# Patient Record
Sex: MALE | Race: WHITE | NOT HISPANIC OR LATINO | URBAN - METROPOLITAN AREA
[De-identification: names, ages, dates, MRNs, and addresses within clinical notes are randomized per-mention and may not be internally consistent; named-entity substitution may affect disease eponyms.]

---

## 2017-07-16 ENCOUNTER — HOSPITAL ENCOUNTER (EMERGENCY)
Facility: CLINIC | Age: 32
Discharge: HOME | End: 2017-07-16
Attending: PSYCHIATRY & NEUROLOGY | Admitting: PSYCHIATRY & NEUROLOGY
Payer: COMMERCIAL

## 2017-07-16 ENCOUNTER — NURSE TRIAGE (OUTPATIENT)
Dept: NURSING | Facility: CLINIC | Age: 32
End: 2017-07-16

## 2017-07-16 VITALS
RESPIRATION RATE: 16 BRPM | HEIGHT: 71 IN | WEIGHT: 185 LBS | SYSTOLIC BLOOD PRESSURE: 113 MMHG | TEMPERATURE: 97.2 F | OXYGEN SATURATION: 100 % | HEART RATE: 54 BPM | BODY MASS INDEX: 25.9 KG/M2 | DIASTOLIC BLOOD PRESSURE: 63 MMHG

## 2017-07-16 DIAGNOSIS — F42.9 OBSESSIVE-COMPULSIVE DISORDER, UNSPECIFIED TYPE: ICD-10-CM

## 2017-07-16 PROCEDURE — 90791 PSYCH DIAGNOSTIC EVALUATION: CPT

## 2017-07-16 PROCEDURE — 99285 EMERGENCY DEPT VISIT HI MDM: CPT | Mod: 25 | Performed by: PSYCHIATRY & NEUROLOGY

## 2017-07-16 PROCEDURE — 99284 EMERGENCY DEPT VISIT MOD MDM: CPT | Mod: Z6 | Performed by: PSYCHIATRY & NEUROLOGY

## 2017-07-16 ASSESSMENT — ENCOUNTER SYMPTOMS
NERVOUS/ANXIOUS: 1
HALLUCINATIONS: 0
DYSPHORIC MOOD: 1
FEVER: 0
SHORTNESS OF BREATH: 0
ABDOMINAL PAIN: 0

## 2017-07-16 NOTE — TELEPHONE ENCOUNTER
Patient calling to report he spoke with his therapist yesterday and reported thoughts of suicide and was recommended he be evaluated in ER. Will go to [place].   Reason for Disposition    Sometimes has thoughts of suicide    Additional Information    Negative: Patient attempted suicide    Negative: Patient is threatening suicide now    Negative: Patient is threatening to kill a specific person    Negative: [1] Patient is very confused (disoriented, slurred speech) AND [2] no other adult (e.g., friend or family member) available    Negative: [1] Difficult to awaken or acting very confused (disoriented, slurred speech) AND [2] new onset    Negative: Sounds like a life-threatening emergency to the triager    Negative: Alcohol use, abuse or dependence, question or problem related to    Negative: Drug abuse or dependence, question or problem related to    Negative: Bipolar disorder (manic depression)    Negative: Depression during the postpartum period (< 1 year since delivery)    Negative: [1] Depression AND [2] unable to do any of normal activities (e.g., self care, school, work; in comparison to baseline).    Negative: Bizarre or confused behavior    Negative: Patient sounds very sick or weak to the triager    Negative: [1] Depression AND [2] worsening (e.g.,sleeping poorly, less able to do activities of daily living)    Negative: Symptoms interfere with work or school    Protocols used: DEPRESSION-ADULT-

## 2017-07-16 NOTE — ED AVS SNAPSHOT
[facility], Emergency Department    [address]    [place] [address]    Phone:  [phone]    Fax:  [phone]                                       Tima Biswas   MRN: [id number]    Department:  [facility], Emergency Department   Date of Visit:  7/16/2017           Patient Information     Date Of Birth          1985        Your diagnoses for this visit were:     Obsessive-compulsive disorder, unspecified type        You were seen by Rocco Arroyo MD.        Discharge Instructions       Follow up with your therapist and couples counseling      Follow up with your psychiatrist for medication management    24 Hour Appointment Hotline       To make an appointment at any [facility] clinic, call [phone] ([phone]). If you don't have a family doctor or clinic, we will help you find one. [facility] clinics are conveniently located to serve the needs of you and your family.             Review of your medicines      Our records show that you are taking the medicines listed below. If these are incorrect, please call your family doctor or clinic.        Dose / Directions Last dose taken    OMEPRAZOLE PO        Take by mouth daily   Refills:  0        SERTRALINE HCL PO   Dose:  75 mg        Take 75 mg by mouth daily   Refills:  0                Orders Needing Specimen Collection     Ordered          07/16/17 2152  Drug abuse screen 6 urine (chem dep) ([facility]) - STAT, [place]: STAT, Needs to be Collected     Scheduled Task Status   07/16/17 2153 Collect Drug abuse screen 6 urine (chem dep) ([facility]) Open   Order Class:  PCU Collect                  Pending Results     No orders found from 7/14/2017 to 7/17/2017.            Pending Culture Results     No orders found from 7/14/2017 to 7/17/2017.            Pending Results Instructions     If you had any lab results that were not finalized at the time of your Discharge, you can call the ED Lab Result RN at [phone]. You will be contacted by  "this team for any positive Lab results or changes in treatment. The nurses are available 7 days a week from 10A to 6:30P.  You can leave a message 24 hours per day and they will return your call.        Thank you for choosing [facility]       Thank you for choosing [facility] for your care. Our goal is always to provide you with excellent care. Hearing back from our patients is one way we can continue to improve our services. Please take a few minutes to complete the written survey that you may receive in the mail after you visit with us. Thank you!        [employer] Information     [employer] lets you send messages to your doctor, view your test results, renew your prescriptions, schedule appointments and more. To sign up, go to [email or web address] . Click on \"Log in\" on the left side of the screen, which will take you to the Welcome page. Then click on \"Sign up Now\" on the right side of the page.     You will be asked to enter the access code listed below, as well as some personal information. Please follow the directions to create your username and password.     Your access code is: [email or web address]  Expires: 10/14/2017 10:46 PM     Your access code will  in 90 days. If you need help or a new code, please call your [facility] clinic or [phone].        Care EveryWhere ID     This is your Care EveryWhere ID. This could be used by other organizations to access your [facility] medical records  [id number]        Equal Access to Services     KAUSHIK HRENANDEZ : Hadii david gardunoo Sodione, waaxda luqadaha, qaybta kaalmada kai, nano rodriguez . So [facility] [phone].    ATENCIÓN: Si habla español, tiene a garnica disposición servicios gratuitos de asistencia lingüística. Llame al [phone].    We comply with applicable federal civil rights laws and [place] laws. We do not discriminate on the basis of race, color, national origin, age, disability sex, sexual orientation or gender " identity.            After Visit Summary       This is your record. Keep this with you and show to your community pharmacist(s) and doctor(s) at your next visit.

## 2017-07-16 NOTE — ED AVS SNAPSHOT
[facility], [place], Emergency Department    [address]    [facility] [address]    Phone:  [phone]    Fax:  [phone]                                       Tima Biswas   MRN: [id number]    Department:  [facility], Emergency Department   Date of Visit:  7/16/2017           After Visit Summary Signature Page     I have received my discharge instructions, and my questions have been answered. I have discussed any challenges I see with this plan with the nurse or doctor.    ..........................................................................................................................................  Patient/Patient Representative Signature      ..........................................................................................................................................  Patient Representative Print Name and Relationship to Patient    ..................................................               ................................................  Date                                            Time    ..........................................................................................................................................  Reviewed by Signature/Title    ...................................................              ..............................................  Date                                                            Time

## 2017-07-17 NOTE — ED PROVIDER NOTES
"  History     Chief Complaint   Patient presents with     Suicidal     Suicidal without plan     The history is provided by the patient, medical records and the spouse.     Tima Biswas is a 31 year old male who comes in due to his therapist telling him yesterday that he should come to the ED for his suicidal thoughts.  He has had chronic suicidal thoughts since age 13.  He states that they have been stronger recently due to his marital struggles.  His wife has anxiety and they were struggling.  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  That has since been broken off.  He has a therapist who also happens to be his wife's therapist and their couple's counselor.  He has a psychiatrist. He has a history of anxiety, depression and OCD.  He is on zoloft 75 mg.  He tried to increase it to 100 mg but decreased it back to 75 mg because he felt numb. He states that he has been having some thoughts of overdosing on his dog's tramadol.  He has never attempted. He does not want to. He has no intent currently.      Please see the 's assessment in [employer] from today for further details.    I have reviewed the Medications, Allergies, Past Medical and Surgical History, and Social History in the Epic system.    Review of Systems   Constitutional: Negative for fever.   Respiratory: Negative for shortness of breath.    Cardiovascular: Negative for chest pain.   Gastrointestinal: Negative for abdominal pain.   Psychiatric/Behavioral: Positive for dysphoric mood and suicidal ideas (chronic, passive). Negative for hallucinations and self-injury. The patient is nervous/anxious.    All other systems reviewed and are negative.      Physical Exam   BP: 122/68  Heart Rate: 58  Temp: 96.7  F (35.9  C)  Resp: 16  Height: 180.3 cm (5' 11\")  Weight: 83.9 kg (185 lb)  SpO2: 98 %  Physical Exam   Constitutional: He is oriented to person, place, and time. He appears well-developed and well-nourished.   HENT:   Head: Normocephalic " and atraumatic.   Mouth/Throat: Oropharynx is clear and moist. No oropharyngeal exudate.   Eyes: Pupils are equal, round, and reactive to light.   Neck: Normal range of motion. Neck supple.   Cardiovascular: Normal rate, regular rhythm and normal heart sounds.    Pulmonary/Chest: Effort normal and breath sounds normal. No respiratory distress.   Abdominal: Soft. Bowel sounds are normal. There is no tenderness.   Musculoskeletal: Normal range of motion.   Neurological: He is alert and oriented to person, place, and time.   Skin: Skin is warm. No rash noted.   Psychiatric: His speech is normal and behavior is normal. Judgment normal. His mood appears anxious. He is not actively hallucinating. Thought content is not paranoid and not delusional. Cognition and memory are normal. He exhibits a depressed mood. He expresses suicidal ideation. He expresses no homicidal ideation. He expresses no suicidal plans and no homicidal plans.   Tima is a 30 y/o male who looks his age. He is well groomed with good eye contact.   Nursing note and vitals reviewed.      ED Course     ED Course     Procedures               Labs Ordered and Resulted from Time of ED Arrival Up to the Time of Departure from the ED - No data to display         Assessments & Plan (with Medical Decision Making)   Tima will be discharged home.  He is not an imminent risk to himself or others.  He has chronic suicidal thoughts with no intent right now.  His wife is going to take the dog's tramadol so he has no access to it.  He will continue to follow up with his therapist and couples counseling. He will follow up with his psychiatrist as well.    I have reviewed the nursing notes.    I have reviewed the findings, diagnosis, plan and need for follow up with the patient.    New Prescriptions    No medications on file       Final diagnoses:   Obsessive-compulsive disorder, unspecified type       7/16/2017   [facility], [place], EMERGENCY DEPARTMENT     Dina  Rocco BECK MD  07/16/17 2010

## 2017-07-17 NOTE — DISCHARGE INSTRUCTIONS
Follow up with your therapist and couples counseling      Follow up with your psychiatrist for medication management

## 2018-11-21 DIAGNOSIS — R06.02 SOB (SHORTNESS OF BREATH): ICD-10-CM

## 2018-11-21 DIAGNOSIS — R05.9 COUGH: Primary | ICD-10-CM

## 2018-11-21 NOTE — TELEPHONE ENCOUNTER
FUTURE VISIT INFORMATION      FUTURE VISIT INFORMATION:    Date: 12.7.18    Time: 9:00 AM    Location: [facility]   REFERRAL INFORMATION:    Referring provider:  Self-referred    Referring providers clinic:  Self-referred    Reason for visit/diagnosis  Cough and SOB    RECORDS REQUESTED FROM:       Clinic name Comments Records Status Imaging Status                                           **Attempted to call pt to see if there are any medical records, however voicemail box is full.

## 2018-12-07 ENCOUNTER — OFFICE VISIT (OUTPATIENT)
Dept: PULMONOLOGY | Facility: CLINIC | Age: 33
End: 2018-12-07
Attending: INTERNAL MEDICINE
Payer: COMMERCIAL

## 2018-12-07 ENCOUNTER — RADIANT APPOINTMENT (OUTPATIENT)
Dept: GENERAL RADIOLOGY | Facility: CLINIC | Age: 33
End: 2018-12-07
Payer: COMMERCIAL

## 2018-12-07 ENCOUNTER — PRE VISIT (OUTPATIENT)
Dept: PULMONOLOGY | Facility: CLINIC | Age: 33
End: 2018-12-07

## 2018-12-07 VITALS
HEIGHT: 71 IN | DIASTOLIC BLOOD PRESSURE: 80 MMHG | OXYGEN SATURATION: 98 % | RESPIRATION RATE: 16 BRPM | HEART RATE: 58 BPM | BODY MASS INDEX: 26.6 KG/M2 | WEIGHT: 190 LBS | SYSTOLIC BLOOD PRESSURE: 126 MMHG

## 2018-12-07 DIAGNOSIS — M25.561 ARTHRALGIA OF BOTH KNEES: ICD-10-CM

## 2018-12-07 DIAGNOSIS — Z91.89 RECENTLY IN LYME DISEASE ENDEMIC AREA: ICD-10-CM

## 2018-12-07 DIAGNOSIS — R05.9 COUGH: ICD-10-CM

## 2018-12-07 DIAGNOSIS — M25.562 ARTHRALGIA OF BOTH KNEES: ICD-10-CM

## 2018-12-07 DIAGNOSIS — R06.02 SOB (SHORTNESS OF BREATH): Primary | ICD-10-CM

## 2018-12-07 DIAGNOSIS — R06.02 SOB (SHORTNESS OF BREATH): ICD-10-CM

## 2018-12-07 DIAGNOSIS — J45.30 MILD PERSISTENT ASTHMA WITHOUT COMPLICATION: Primary | ICD-10-CM

## 2018-12-07 PROCEDURE — G0463 HOSPITAL OUTPT CLINIC VISIT: HCPCS | Mod: ZF

## 2018-12-07 RX ORDER — MONTELUKAST SODIUM 10 MG/1
10 TABLET ORAL EVERY EVENING
Qty: 30 TABLET | Refills: 11 | Status: SHIPPED | OUTPATIENT
Start: 2018-12-07 | End: 2020-01-16

## 2018-12-07 ASSESSMENT — PAIN SCALES - GENERAL: PAINLEVEL: NO PAIN (0)

## 2018-12-07 NOTE — LETTER
12/7/2018       RE: Tima Biswas  [address] [facility] [address]     Dear Colleague,    Thank you for referring your patient, Tima Biswas, to the [facility] at [facility]. Please see a copy of my visit note below.    [facility] Pulmonology Initial Consult    Reason for Visit  Tima Biswas is a 32 year old male who is referred by himself for cough  Pulmonary HPI  Sporadic cough/hack over a couple of years. Omeprazole, steroids, albuterol have been attempted without improvement. Reports the cough has increased in severity over time, humidity/changing seasons including shower. Sometimes after eating, but no choking sensation. He does experience severe heartburn for years, takes Tums a couple of times a day. Feels like he can't take a deep breath. Sinus congestion without drainage. Reports occasional sensation of choking that seem to be spontaneous, not related to eating or supine position, no associated heartburn. He reports fatigue, irritability, joint pain. Exercise helps to loosen them up, knees and ankles especially, back always so he barely thinks about it.  He particularly notes tenderness and swelling at different points on the knee where tendons and ligaments insert.  He also used to swim, but reports doing so less partly because of coughing and partly because of shortness of breath.  Wakes at 4:30-5:30, joints don't loosen up until after 730. He does see swelling in the knees and tenderness.  He is also concerned about Lyme disease.  He grew up in [place], [place] and his father not long ago was diagnosed with Guyon Barré and that was attributed to Lyme disease.  He does not recall any specific acute illness but does have some symptoms and could be consistent with chronic Lyme infection.    The patient was seen and examined by Felicia Kasper MD   Current Outpatient Prescriptions   Medication  "    SERTRALINE HCL PO     OMEPRAZOLE PO     No current facility-administered medications for this visit.      Allergies   Allergen Reactions     Cats      Social History     Socioeconomic History     Marital status:      Spouse name: Not on file     Number of children: Not on file     Years of education: Not on file     Highest education level: Not on file   Social Needs     Financial resource strain: Not on file     Food insecurity - worry: Not on file     Food insecurity - inability: Not on file     Transportation needs - medical: Not on file     Transportation needs - non-medical: Not on file   Occupational History     Not on file   Tobacco Use     Smoking status: Never Smoker     Smokeless tobacco: Never Used   Substance and Sexual Activity     Alcohol use: Yes     Comment: rare     Drug use: No     Sexual activity: Not on file   Other Topics Concern     Not on file   Social History Narrative     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     Past Medical History:   Diagnosis Date     Depressive disorder      Gastroesophageal reflux disease      OCD (obsessive compulsive disorder)      Past Surgical History:   Procedure Laterality Date     GENITOURINARY SURGERY       Family History   Problem Relation Age of Onset     LUNG DISEASE Mother         idiopathic lung disease treated with steroids     Neuromuscular Disease Father         [place] associated with chronic Lyme     Lung Cancer No family hx of        ROS Pulmonary  Dyspnea: Yes, Cough: Yes, Chest pain: No, Wheezing: No, Sputum Production: No, Hemoptysis: No  A complete ROS was otherwise negative except as noted in the HPI.  /80 (BP Location: Right arm, Patient Position: Chair, Cuff Size: Adult Regular)  Pulse 58  Resp 16  Ht 1.803 m (5' 10.98\")  Wt 86.2 kg (190 lb)  SpO2 98%  BMI 26.51 kg/m2  Exam:   GENERAL APPEARANCE: Well developed, well nourished, alert, and in no apparent distress.  EYES: PERRL, EOMI  HENT: Nasal mucosa with no edema and no " hyperemia. No nasal polyps.  EARS: Canals clear, TMs normal  MOUTH: Oral mucosa is moist, without any lesions, no tonsillar enlargement, no oropharyngeal exudate.  NECK: supple, no masses, no thyromegaly.  LYMPHATICS: No significant axillary, cervical, or supraclavicular nodes.  RESP: normal percussion, good air flow throughout.  No crackles. No rhonchi. No wheezes.  CV: Normal S1, S2, regular rhythm, normal rate. No murmur.  No rub. No gallop. No LE edema.   ABDOMEN:  Bowel sounds normal, soft, nontender, no HSM or masses.   MS: extremities normal. No clubbing. No cyanosis.  SKIN: no rash on limited exam  NEURO: Mentation intact, speech normal, normal strength and tone, normal gait and stance  PSYCH: mentation appears normal. and affect normal/bright  Results:  Pulmonary function test today were reviewed and showed normal spirometry, lung volumes, diffusion capacity  Chest x-ray from today images were reviewed and normal clear bilateral lung fields.    Assessment and plan: Elijah is a 32-year-old male who is a  employed at the [employer].  He is here today for 2 years of unexplained cough, with some shortness of breath he also has a variety of other symptoms including heartburn, fatigue, knee pain, concern for Lyme disease.  Today we reviewed the differential diagnosis which for him is most likely cough variant asthma, gastroesophageal reflux disease.  He has not responded to prolonged therapy with a PPI or intermittent albuterol.    Possible mild persistent asthma, cough variant-today we are starting daily montelukast.  We will see if this improves his cough, if no improvement in about a month then we would consider escalating to an inhaled steroid.  If there is no relief with an inhaled steroid after 1-2 months, I would plan on performing a CT of his chest.    Gastroesophageal reflux disease with heartburn-he tries to avoid triggers as best as possible, I have asked him to stop taking Tums  every day.  I recommended an H2 blocker for symptomatic relief of heartburn whenever he has it.  If these do not improve the situation, then I will pursue additional testing.    Arthralgias, concern for Lyme disease-he reports extended morning stiffness but attributes it to strenuous strength training exercise.  His constellation of back pain, enthesitis could also be consistent with ankylosing spondylitis.  At his request, I will order a Lyme serology.  We can consider doing spine x-rays in the future.      Again, thank you for allowing me to participate in the care of your patient.      Sincerely,    Felicia Kasper MD

## 2018-12-07 NOTE — NURSING NOTE
Chief Complaint   Patient presents with     Consult     Cough and Shortness of Breathe      Tiara Hanna CMA

## 2018-12-07 NOTE — LETTER
Date:December 12, 2018      Patient was self referred, no letter generated. Do not send.        [facility] Information

## 2018-12-07 NOTE — PROGRESS NOTES
[facility] Pulmonology Initial Consult    Reason for Visit  Tima Biswas is a 32 year old male who is referred by himself for cough  Pulmonary HPI  Sporadic cough/hack over a couple of years. Omeprazole, steroids, albuterol have been attempted without improvement. Reports the cough has increased in severity over time, humidity/changing seasons including shower. Sometimes after eating, but no choking sensation. He does experience severe heartburn for years, takes Tums a couple of times a day. Feels like he can't take a deep breath. Sinus congestion without drainage. Reports occasional sensation of choking that seem to be spontaneous, not related to eating or supine position, no associated heartburn. He reports fatigue, irritability, joint pain. Exercise helps to loosen them up, knees and ankles especially, back always so he barely thinks about it.  He particularly notes tenderness and swelling at different points on the knee where tendons and ligaments insert.  He also used to swim, but reports doing so less partly because of coughing and partly because of shortness of breath.  Wakes at 4:30-5:30, joints don't loosen up until after 730. He does see swelling in the knees and tenderness.  He is also concerned about Lyme disease.  He grew up in [place], [place] and his father not long ago was diagnosed with Guyon Barré and that was attributed to Lyme disease.  He does not recall any specific acute illness but does have some symptoms and could be consistent with chronic Lyme infection.    The patient was seen and examined by Felicia Kasper MD   Current Outpatient Prescriptions   Medication     SERTRALINE HCL PO     OMEPRAZOLE PO     No current facility-administered medications for this visit.      Allergies   Allergen Reactions     Cats      Social History     Socioeconomic History     Marital status:      Spouse name: Not on file     Number of children: Not on file     Years of  "education: Not on file     Highest education level: Not on file   Social Needs     Financial resource strain: Not on file     Food insecurity - worry: Not on file     Food insecurity - inability: Not on file     Transportation needs - medical: Not on file     Transportation needs - non-medical: Not on file   Occupational History     Not on file   Tobacco Use     Smoking status: Never Smoker     Smokeless tobacco: Never Used   Substance and Sexual Activity     Alcohol use: Yes     Comment: rare     Drug use: No     Sexual activity: Not on file   Other Topics Concern     Not on file   Social History Narrative     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     Past Medical History:   Diagnosis Date     Depressive disorder      Gastroesophageal reflux disease      OCD (obsessive compulsive disorder)      Past Surgical History:   Procedure Laterality Date     GENITOURINARY SURGERY       Family History   Problem Relation Age of Onset     LUNG DISEASE Mother         idiopathic lung disease treated with steroids     Neuromuscular Disease Father         Guillan [place] associated with chronic Lyme     Lung Cancer No family hx of        ROS Pulmonary  Dyspnea: Yes, Cough: Yes, Chest pain: No, Wheezing: No, Sputum Production: No, Hemoptysis: No  A complete ROS was otherwise negative except as noted in the HPI.  /80 (BP Location: Right arm, Patient Position: Chair, Cuff Size: Adult Regular)  Pulse 58  Resp 16  Ht 1.803 m (5' 10.98\")  Wt 86.2 kg (190 lb)  SpO2 98%  BMI 26.51 kg/m2  Exam:   GENERAL APPEARANCE: Well developed, well nourished, alert, and in no apparent distress.  EYES: PERRL, EOMI  HENT: Nasal mucosa with no edema and no hyperemia. No nasal polyps.  EARS: Canals clear, TMs normal  MOUTH: Oral mucosa is moist, without any lesions, no tonsillar enlargement, no oropharyngeal exudate.  NECK: supple, no masses, no thyromegaly.  LYMPHATICS: No significant axillary, cervical, or supraclavicular nodes.  RESP: normal " percussion, good air flow throughout.  No crackles. No rhonchi. No wheezes.  CV: Normal S1, S2, regular rhythm, normal rate. No murmur.  No rub. No gallop. No LE edema.   ABDOMEN:  Bowel sounds normal, soft, nontender, no HSM or masses.   MS: extremities normal. No clubbing. No cyanosis.  SKIN: no rash on limited exam  NEURO: Mentation intact, speech normal, normal strength and tone, normal gait and stance  PSYCH: mentation appears normal. and affect normal/bright  Results:  Pulmonary function test today were reviewed and showed normal spirometry, lung volumes, diffusion capacity  Chest x-ray from today images were reviewed and normal clear bilateral lung fields.    Assessment and plan: Elijah is a 32-year-old male who is a  employed at the [employer].  He is here today for 2 years of unexplained cough, with some shortness of breath he also has a variety of other symptoms including heartburn, fatigue, knee pain, concern for Lyme disease.  Today we reviewed the differential diagnosis which for him is most likely cough variant asthma, gastroesophageal reflux disease.  He has not responded to prolonged therapy with a PPI or intermittent albuterol.    Possible mild persistent asthma, cough variant-today we are starting daily montelukast.  We will see if this improves his cough, if no improvement in about a month then we would consider escalating to an inhaled steroid.  If there is no relief with an inhaled steroid after 1-2 months, I would plan on performing a CT of his chest.    Gastroesophageal reflux disease with heartburn-he tries to avoid triggers as best as possible, I have asked him to stop taking Tums every day.  I recommended an H2 blocker for symptomatic relief of heartburn whenever he has it.  If these do not improve the situation, then I will pursue additional testing.    Arthralgias, concern for Lyme disease-he reports extended morning stiffness but attributes it to strenuous strength  training exercise.  His constellation of back pain, enthesitis could also be consistent with ankylosing spondylitis.  At his request, I will order a Lyme serology.  We can consider doing spine x-rays in the future.

## 2018-12-07 NOTE — MR AVS SNAPSHOT
After Visit Summary   12/7/2018    Tima Biswas    MRN: [id number]           Patient Information     Date Of Birth          1985        Visit Information        Provider Department      12/7/2018 9:00 AM Felicia Kasper MD [facility]        Care Instructions    I will look into the Lyme testing. Probably unrelated to cough.     I will start montelukast for allergies/asthma. Take this every day. Another option is inhaled steroids.    In about a month, you don't notice an improvement in cough, we can try an inhaler, if no improvement in a couple of months, let's plan for a CT of the chest.    Stop taking tums. If you have heartburn, use H2 blocker or PPI (Zantac, omeprazole, etc)            Follow-ups after your visit        Follow-up notes from your care team     Return in about 3 months (around 3/7/2019).      Your next 10 appointments already scheduled     Mar 19, 2019  1:00 PM CDT   (Arrive by 12:45 PM)   Return Visit with Felicia Kasper MD   [facility] ([facility])    [address] [place] [id number] [phone]              Who to contact     If you have questions or need follow up information about today's clinic visit or your schedule please contact [facility] directly at [phone].  Normal or non-critical lab and imaging results will be communicated to you by MyChart, letter or phone within 4 business days after the clinic has received the results. If you do not hear from us within 7 days, please contact the clinic through MyChart or phone. If you have a critical or abnormal lab result, we will notify you by phone as soon as possible.  Submit refill requests through e-volo or call your pharmacy and they will forward the refill request to us. Please allow 3 business days for your refill to be completed.          Additional  "Information About Your Visit        MyChart Information     [email or web address] lets you send messages to your doctor, view your test results, renew your prescriptions, schedule appointments and more. To sign up, go to [email or web address] . Click on \"Log in\" on the left side of the screen, which will take you to the Welcome page. Then click on \"Sign up Now\" on the right side of the page.     You will be asked to enter the access code listed below, as well as some personal information. Please follow the directions to create your username and password.     Your access code is: [email or web address]  Expires: 2019  6:30 AM     Your access code will  in 90 days. If you need help or a new code, please call your [facility] clinic or [phone].        Care EveryWhere ID     This is your Care EveryWhere ID. This could be used by other organizations to access your [facility] medical records  [id number]        Your Vitals Were     Pulse Respirations Height Pulse Oximetry BMI (Body Mass Index)       58 16 1.803 m (5' 10.98\") 98% 26.51 kg/m2        Blood Pressure from Last 3 Encounters:   18 126/80   17 113/63    Weight from Last 3 Encounters:   18 86.2 kg (190 lb)   17 83.9 kg (185 lb)              Today, you had the following     No orders found for display       Primary Care Provider    [facility]       No address on file        Equal Access to Services     KAUSHIK HERNANDEZ : Hadii david gardunoo Sodione, waaxda luqadaha, qaybta kaalmada mikyyaignacio, nano rodriguez . So [facility] [phone].    ATENCIÓN: Si habla español, tiene a garnica disposición servicios gratuitos de asistencia lingüística. Llame al [phone].    We comply with applicable federal civil rights laws and [place] laws. We do not discriminate on the basis of race, color, national origin, age, disability, sex, sexual orientation, or gender identity.            Thank you!     Thank you for choosing [facility] " [facility]  for your care. Our goal is always to provide you with excellent care. Hearing back from our patients is one way we can continue to improve our services. Please take a few minutes to complete the written survey that you may receive in the mail after your visit with us. Thank you!             Your Updated Medication List - Protect others around you: Learn how to safely use, store and throw away your medicines at www.disposemymeds.org.          This list is accurate as of 12/7/18  9:50 AM.  Always use your most recent med list.                   Brand Name Dispense Instructions for use Diagnosis    OMEPRAZOLE PO      Take by mouth daily        SERTRALINE HCL PO      Take 50 mg by mouth daily

## 2018-12-09 LAB
DLCOUNC-%PRED-PRE: 97 %
DLCOUNC-PRE: 34.88 ML/MIN/MMHG
DLCOUNC-PRED: 35.68 ML/MIN/MMHG
ERV-%PRED-PRE: 79 %
ERV-PRE: 1.46 L
ERV-PRED: 1.84 L
EXPTIME-PRE: 7.36 SEC
FEF2575-%PRED-PRE: 108 %
FEF2575-PRE: 5.08 L/SEC
FEF2575-PRED: 4.69 L/SEC
FEFMAX-%PRED-PRE: 97 %
FEFMAX-PRE: 10.42 L/SEC
FEFMAX-PRED: 10.72 L/SEC
FEV1-%PRED-PRE: 103 %
FEV1-PRE: 4.9 L
FEV1FEV6-PRE: 83 %
FEV1FEV6-PRED: 83 %
FEV1FVC-PRE: 83 %
FEV1FVC-PRED: 81 %
FEV1SVC-PRE: 84 %
FEV1SVC-PRED: 79 %
FIFMAX-PRE: 10.1 L/SEC
FRCPLETH-%PRED-PRE: 84 %
FRCPLETH-PRE: 2.93 L
FRCPLETH-PRED: 3.48 L
FVC-%PRED-PRE: 102 %
FVC-PRE: 5.92 L
FVC-PRED: 5.79 L
IC-%PRED-PRE: 105 %
IC-PRE: 4.39 L
IC-PRED: 4.15 L
RVPLETH-%PRED-PRE: 78 %
RVPLETH-PRE: 1.47 L
RVPLETH-PRED: 1.87 L
TLCPLETH-%PRED-PRE: 97 %
TLCPLETH-PRE: 7.31 L
TLCPLETH-PRED: 7.53 L
VA-%PRED-PRE: 91 %
VA-PRE: 6.6 L
VC-%PRED-PRE: 97 %
VC-PRE: 5.85 L
VC-PRED: 5.99 L

## 2019-01-15 DIAGNOSIS — M25.50 MULTIPLE JOINT PAIN: ICD-10-CM

## 2019-01-15 DIAGNOSIS — M54.50 CHRONIC LOW BACK PAIN WITHOUT SCIATICA, UNSPECIFIED BACK PAIN LATERALITY: ICD-10-CM

## 2019-01-15 DIAGNOSIS — J45.30 MILD PERSISTENT ASTHMA WITHOUT COMPLICATION: Primary | ICD-10-CM

## 2019-01-15 DIAGNOSIS — G89.29 CHRONIC LOW BACK PAIN WITHOUT SCIATICA, UNSPECIFIED BACK PAIN LATERALITY: ICD-10-CM

## 2019-03-19 ENCOUNTER — OFFICE VISIT (OUTPATIENT)
Dept: PULMONOLOGY | Facility: CLINIC | Age: 34
End: 2019-03-19
Attending: INTERNAL MEDICINE
Payer: COMMERCIAL

## 2019-03-19 VITALS
OXYGEN SATURATION: 96 % | HEART RATE: 65 BPM | SYSTOLIC BLOOD PRESSURE: 121 MMHG | WEIGHT: 190 LBS | BODY MASS INDEX: 26.6 KG/M2 | HEIGHT: 71 IN | DIASTOLIC BLOOD PRESSURE: 79 MMHG | RESPIRATION RATE: 16 BRPM

## 2019-03-19 DIAGNOSIS — R05.9 COUGH: Primary | ICD-10-CM

## 2019-03-19 DIAGNOSIS — M79.10 MYALGIA: ICD-10-CM

## 2019-03-19 DIAGNOSIS — K21.9 GASTROESOPHAGEAL REFLUX DISEASE, ESOPHAGITIS PRESENCE NOT SPECIFIED: ICD-10-CM

## 2019-03-19 PROCEDURE — G0463 HOSPITAL OUTPT CLINIC VISIT: HCPCS | Mod: ZF

## 2019-03-19 ASSESSMENT — ENCOUNTER SYMPTOMS
POSTURAL DYSPNEA: 0
DYSPNEA ON EXERTION: 1
ALTERED TEMPERATURE REGULATION: 0
WEIGHT GAIN: 0
POLYDIPSIA: 0
DIARRHEA: 0
DEPRESSION: 1
NECK PAIN: 1
SHORTNESS OF BREATH: 1
ABDOMINAL PAIN: 0
COUGH DISTURBING SLEEP: 0
CONSTIPATION: 1
RECTAL PAIN: 0
PANIC: 0
COUGH: 1
FATIGUE: 1
MUSCLE WEAKNESS: 0
BLOOD IN STOOL: 0
INCREASED ENERGY: 0
WHEEZING: 0
HEARTBURN: 1
POLYPHAGIA: 1
HEMOPTYSIS: 0
INSOMNIA: 1
NAUSEA: 0
DECREASED CONCENTRATION: 1
STIFFNESS: 1
SPUTUM PRODUCTION: 0
BOWEL INCONTINENCE: 0
BLOATING: 0
FEVER: 0
DECREASED APPETITE: 0
NIGHT SWEATS: 0
NERVOUS/ANXIOUS: 1
ARTHRALGIAS: 1
CHILLS: 0
SNORES LOUDLY: 0
VOMITING: 0
HALLUCINATIONS: 0
WEIGHT LOSS: 0
BACK PAIN: 1
MYALGIAS: 1
MUSCLE CRAMPS: 0
JAUNDICE: 0

## 2019-03-19 ASSESSMENT — PATIENT HEALTH QUESTIONNAIRE - PHQ9
10. IF YOU CHECKED OFF ANY PROBLEMS, HOW DIFFICULT HAVE THESE PROBLEMS MADE IT FOR YOU TO DO YOUR WORK, TAKE CARE OF THINGS AT HOME, OR GET ALONG WITH OTHER PEOPLE: SOMEWHAT DIFFICULT
SUM OF ALL RESPONSES TO PHQ QUESTIONS 1-9: 15
SUM OF ALL RESPONSES TO PHQ QUESTIONS 1-9: 15

## 2019-03-19 ASSESSMENT — MIFFLIN-ST. JEOR: SCORE: 1833.7

## 2019-03-19 ASSESSMENT — PAIN SCALES - GENERAL: PAINLEVEL: NO PAIN (0)

## 2019-03-19 NOTE — PATIENT INSTRUCTIONS
Depending on how it goes with GI, we can consider methacholine challenge to look for inducible asthma

## 2019-03-19 NOTE — LETTER
3/19/2019       RE: Tima Biswas  [address] [facility] [address] [facility] [address]     Dear Colleague,    Thank you for referring your patient, Tima Biswas, to the [facility] at [facility]. Please see a copy of my visit note below.    [facility] Pulmonology Initial Consult    Reason for Visit  Tima Biswas is a 33 year old male who is followed for cough, shortness of breath  Pulmonary HPI  Since our last visit, I had previous spine MRI reviewed for evidence of sacroiliitis (none, but it was from 2013), Elijah stopped Tums with improvement in heartburn. He did not experience improvement in cough so was started on inhaled steroids (Asmanex) with no relief and thought he was getting worse, so stopped that as well. I ordered Lyme serology at his request last visit but it has not been done.     Has noticed cardio has decreased, hasn't done it much since the IDS climb early march. Hasn't tried albuterol for exertion. He has noticed when eating, has to stop and catch his breath. He is also taking a break from strength training but still has prolonged morning stiffness.     Cough is better in March, it has been cyclical in the past so this is not out of the ordinary.    The patient was seen and examined by Felicia Kasper MD   Current Outpatient Medications   Medication     mometasone (ASMANEX) 220 MCG/INH inhaler     montelukast (SINGULAIR) 10 MG tablet     OMEPRAZOLE PO     SERTRALINE HCL PO     No current facility-administered medications for this visit.      Allergies   Allergen Reactions     Cats      Social History     Socioeconomic History     Marital status:      Spouse name: Not on file     Number of children: Not on file     Years of education: Not on file     Highest education level: Not on file   Occupational History     Not on file   Social Needs     Financial resource strain: Not on file     Food insecurity:      "Worry: Not on file     Inability: Not on file     Transportation needs:     Medical: Not on file     Non-medical: Not on file   Tobacco Use     Smoking status: Never Smoker     Smokeless tobacco: Never Used   Substance and Sexual Activity     Alcohol use: Yes     Comment: rare     Drug use: No     Sexual activity: Not on file   Lifestyle     Physical activity:     Days per week: Not on file     Minutes per session: Not on file     Stress: Not on file   Relationships     Social connections:     Talks on phone: Not on file     Gets together: Not on file     Attends Quaker service: Not on file     Active member of club or organization: Not on file     Attends meetings of clubs or organizations: Not on file     Relationship status: Not on file     Intimate partner violence:     Fear of current or ex partner: Not on file     Emotionally abused: Not on file     Physically abused: Not on file     Forced sexual activity: Not on file   Other Topics Concern     Not on file   Social History Narrative     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     Past Medical History:   Diagnosis Date     Depressive disorder      Gastroesophageal reflux disease      OCD (obsessive compulsive disorder)      Past Surgical History:   Procedure Laterality Date     GENITOURINARY SURGERY       Family History   Problem Relation Age of Onset     LUNG DISEASE Mother         idiopathic lung disease treated with steroids     Neuromuscular Disease Father         Guillan [place] associated with chronic Lyme     Lung Cancer No family hx of        ROS Pulmonary  Dyspnea: Yes, Cough: Yes, Chest pain: No, Wheezing: No, Sputum Production: No, Hemoptysis: No  A complete ROS was otherwise negative except as noted in the HPI.  /79 (BP Location: Right arm, Patient Position: Chair, Cuff Size: Adult Regular)   Pulse 65   Resp 16   Ht 1.803 m (5' 10.98\")   Wt 86.2 kg (190 lb)   SpO2 96%   BMI 26.51 kg/m     Exam:   GENERAL APPEARANCE: Well developed, well " nourished, alert, and in no apparent distress.  EYES: PERRL, EOMI  HENT: Nasal mucosa with no edema and no hyperemia. No nasal polyps.  EARS: Canals clear, TMs normal  MOUTH: Oral mucosa is moist, without any lesions, no tonsillar enlargement, no oropharyngeal exudate.  NECK: supple, no masses, no thyromegaly.  LYMPHATICS: No significant axillary, cervical, or supraclavicular nodes.  RESP: normal percussion, good air flow throughout.  No crackles. No rhonchi. No wheezes.  CV: Normal S1, S2, regular rhythm, normal rate. No murmur.  No rub. No gallop. No LE edema.   ABDOMEN:  Bowel sounds normal, soft, nontender, no HSM or masses.   MS: extremities normal. No clubbing. No cyanosis.  SKIN: no rash on limited exam  NEURO: Mentation intact, speech normal, normal strength and tone, normal gait and stance  PSYCH: mentation appears normal. and affect normal/bright  Results:  Pulmonary function test today were reviewed and showed normal spirometry, lung volumes, diffusion capacity  Chest x-ray from today images were reviewed and normal clear bilateral lung fields.    Assessment and plan: Elijah is a 32-year-old male who is a  employed at the [employer].  He is here today for 2 years of unexplained cough, with some shortness of breath he also has a variety of other symptoms including heartburn, fatigue, knee pain, concern for Lyme disease.  Today we reviewed the differential diagnosis which for him is most likely cough variant asthma, gastroesophageal reflux disease.  He has not responded to prolonged therapy with a PPI or intermittent albuterol.    Possible mild persistent asthma, cough variant- did not respond to montelukast, albuterol, inhaled steroid   In the future, might consider methacholine challenge testing, will pursue GERD treatment at this point    Gastroesophageal reflux disease with heartburn- he has tried omeprazole in the past without relief.    - I am placing a referral to  "GI    Arthralgias,  extended morning stiffness I considered ankylosing spondylitis, he did have MRI a few years ago with normal appearing SI joint.    - rheum referral    He had a positive screen for depression, relayed to me a lifelong struggle with asthma and thoughts of self harm. He has a plan in place (his wife keeps his medications locked up) and used to engage in self-harm but now has more \"socially acceptable means of catharsis\" e.g. Exercise    Total face-to-face visit time 30 minutes, over 50% of which (20 minutes) was spent in counseling and/or coordination of care.     Answers for HPI/ROS submitted by the patient on 3/19/2019   If you checked off any problems, how difficult have these problems made it for you to do your work, take care of things at home, or get along with other people?: Somewhat difficult  PHQ9 TOTAL SCORE: 15  General Symptoms: Yes  Skin Symptoms: No  HENT Symptoms: No  EYE SYMPTOMS: No  HEART SYMPTOMS: No  LUNG SYMPTOMS: Yes  INTESTINAL SYMPTOMS: Yes  URINARY SYMPTOMS: No  REPRODUCTIVE SYMPTOMS: No  SKELETAL SYMPTOMS: Yes  BLOOD SYMPTOMS: No  NERVOUS SYSTEM SYMPTOMS: No  MENTAL HEALTH SYMPTOMS: Yes  Fever: No  Loss of appetite: No  Weight loss: No  Weight gain: No  Fatigue: Yes  Night sweats: No  Chills: No  Increased stress: Yes  Excessive hunger: Yes  Excessive thirst: No  Feeling hot or cold when others believe the temperature is normal: No  Loss of height: No  Post-operative complications: No  Surgical site pain: No  Hallucinations: No  Change in or Loss of Energy: No  Hyperactivity: No  Confusion: No  Cough: Yes  Sputum or phlegm: No  Coughing up blood: No  Difficulty breating or shortness of breath: Yes  Snoring: No  Wheezing: No  Difficulty breathing on exertion: Yes  Nighttime Cough: No  Difficulty breathing when lying flat: No  Heart burn or indigestion: Yes  Nausea: No  Vomiting: No  Abdominal pain: No  Bloating: No  Constipation: Yes  Diarrhea: No  Blood in stool: No  Black " stools: No  Rectal or Anal pain: No  Fecal incontinence: No  Yellowing of skin or eyes: No  Vomit with blood: No  Change in stools: No  Back pain: Yes  Muscle aches: Yes  Neck pain: Yes  Joint pain: Yes  Bone pain: No  Muscle cramps: No  Muscle weakness: No  Joint stiffness: Yes  Bone fracture: No  Nervous or Anxious: Yes  Depression: Yes  Trouble sleeping: Yes  Trouble thinking or concentrating: Yes  Mood changes: Yes  Panic attacks: No      Again, thank you for allowing me to participate in the care of your patient.      Sincerely,    Felicia Kasper MD

## 2019-03-19 NOTE — PROGRESS NOTES
[facility] Pulmonology Initial Consult    Reason for Visit  Tima Biswas is a 33 year old male who is followed for cough, shortness of breath  Pulmonary HPI  Since our last visit, I had previous spine MRI reviewed for evidence of sacroiliitis (none, but it was from 2013), Elijah stopped Tums with improvement in heartburn. He did not experience improvement in cough so was started on inhaled steroids (Asmanex) with no relief and thought he was getting worse, so stopped that as well. I ordered Lyme serology at his request last visit but it has not been done.     Has noticed cardio has decreased, hasn't done it much since the IDS climb early march. Hasn't tried albuterol for exertion. He has noticed when eating, has to stop and catch his breath. He is also taking a break from strength training but still has prolonged morning stiffness.     Cough is better in March, it has been cyclical in the past so this is not out of the ordinary.    The patient was seen and examined by Felicia Kasper MD   Current Outpatient Medications   Medication     mometasone (ASMANEX) 220 MCG/INH inhaler     montelukast (SINGULAIR) 10 MG tablet     OMEPRAZOLE PO     SERTRALINE HCL PO     No current facility-administered medications for this visit.      Allergies   Allergen Reactions     Cats      Social History     Socioeconomic History     Marital status:      Spouse name: Not on file     Number of children: Not on file     Years of education: Not on file     Highest education level: Not on file   Occupational History     Not on file   Social Needs     Financial resource strain: Not on file     Food insecurity:     Worry: Not on file     Inability: Not on file     Transportation needs:     Medical: Not on file     Non-medical: Not on file   Tobacco Use     Smoking status: Never Smoker     Smokeless tobacco: Never Used   Substance and Sexual Activity     Alcohol use: Yes     Comment: rare     Drug use: No      "Sexual activity: Not on file   Lifestyle     Physical activity:     Days per week: Not on file     Minutes per session: Not on file     Stress: Not on file   Relationships     Social connections:     Talks on phone: Not on file     Gets together: Not on file     Attends Roman [employer] service: Not on file     Active member of club or organization: Not on file     Attends meetings of clubs or organizations: Not on file     Relationship status: Not on file     Intimate partner violence:     Fear of current or ex partner: Not on file     Emotionally abused: Not on file     Physically abused: Not on file     Forced sexual activity: Not on file   Other Topics Concern     Not on file   Social History Narrative     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     Past Medical History:   Diagnosis Date     Depressive disorder      Gastroesophageal reflux disease      OCD (obsessive compulsive disorder)      Past Surgical History:   Procedure Laterality Date     GENITOURINARY SURGERY       Family History   Problem Relation Age of Onset     LUNG DISEASE Mother         idiopathic lung disease treated with steroids     Neuromuscular Disease Father         [place] associated with chronic Lyme     Lung Cancer No family hx of        ROS Pulmonary  Dyspnea: Yes, Cough: Yes, Chest pain: No, Wheezing: No, Sputum Production: No, Hemoptysis: No  A complete ROS was otherwise negative except as noted in the HPI.  /79 (BP Location: Right arm, Patient Position: Chair, Cuff Size: Adult Regular)   Pulse 65   Resp 16   Ht 1.803 m (5' 10.98\")   Wt 86.2 kg (190 lb)   SpO2 96%   BMI 26.51 kg/m    Exam:   GENERAL APPEARANCE: Well developed, well nourished, alert, and in no apparent distress.  EYES: PERRL, EOMI  HENT: Nasal mucosa with no edema and no hyperemia. No nasal polyps.  EARS: Canals clear, TMs normal  MOUTH: Oral mucosa is moist, without any lesions, no tonsillar enlargement, no oropharyngeal exudate.  NECK: supple, no masses, no " thyromegaly.  LYMPHATICS: No significant axillary, cervical, or supraclavicular nodes.  RESP: normal percussion, good air flow throughout.  No crackles. No rhonchi. No wheezes.  CV: Normal S1, S2, regular rhythm, normal rate. No murmur.  No rub. No gallop. No LE edema.   ABDOMEN:  Bowel sounds normal, soft, nontender, no HSM or masses.   MS: extremities normal. No clubbing. No cyanosis.  SKIN: no rash on limited exam  NEURO: Mentation intact, speech normal, normal strength and tone, normal gait and stance  PSYCH: mentation appears normal. and affect normal/bright  Results:  Pulmonary function test today were reviewed and showed normal spirometry, lung volumes, diffusion capacity  Chest x-ray from today images were reviewed and normal clear bilateral lung fields.    Assessment and plan: Elijah is a 32-year-old male who is a  employed at the [employer].  He is here today for 2 years of unexplained cough, with some shortness of breath he also has a variety of other symptoms including heartburn, fatigue, knee pain, concern for Lyme disease.  Today we reviewed the differential diagnosis which for him is most likely cough variant asthma, gastroesophageal reflux disease.  He has not responded to prolonged therapy with a PPI or intermittent albuterol.    Possible mild persistent asthma, cough variant- did not respond to montelukast, albuterol, inhaled steroid   In the future, might consider methacholine challenge testing, will pursue GERD treatment at this point    Gastroesophageal reflux disease with heartburn- he has tried omeprazole in the past without relief.    - I am placing a referral to GI    Arthralgias,  extended morning stiffness I considered ankylosing spondylitis, he did have MRI a few years ago with normal appearing SI joint.    - rheum referral    He had a positive screen for depression, relayed to me a lifelong struggle with asthma and thoughts of self harm. He has a plan in place (his  "wife keeps his medications locked up) and used to engage in self-harm but now has more \"socially acceptable means of catharsis\" e.g. Exercise    Total face-to-face visit time 30 minutes, over 50% of which (20 minutes) was spent in counseling and/or coordination of care.     Answers for HPI/ROS submitted by the patient on 3/19/2019   If you checked off any problems, how difficult have these problems made it for you to do your work, take care of things at home, or get along with other people?: Somewhat difficult  PHQ9 TOTAL SCORE: 15  General Symptoms: Yes  Skin Symptoms: No  HENT Symptoms: No  EYE SYMPTOMS: No  HEART SYMPTOMS: No  LUNG SYMPTOMS: Yes  INTESTINAL SYMPTOMS: Yes  URINARY SYMPTOMS: No  REPRODUCTIVE SYMPTOMS: No  SKELETAL SYMPTOMS: Yes  BLOOD SYMPTOMS: No  NERVOUS SYSTEM SYMPTOMS: No  MENTAL HEALTH SYMPTOMS: Yes  Fever: No  Loss of appetite: No  Weight loss: No  Weight gain: No  Fatigue: Yes  Night sweats: No  Chills: No  Increased stress: Yes  Excessive hunger: Yes  Excessive thirst: No  Feeling hot or cold when others believe the temperature is normal: No  Loss of height: No  Post-operative complications: No  Surgical site pain: No  Hallucinations: No  Change in or Loss of Energy: No  Hyperactivity: No  Confusion: No  Cough: Yes  Sputum or phlegm: No  Coughing up blood: No  Difficulty breating or shortness of breath: Yes  Snoring: No  Wheezing: No  Difficulty breathing on exertion: Yes  Nighttime Cough: No  Difficulty breathing when lying flat: No  Heart burn or indigestion: Yes  Nausea: No  Vomiting: No  Abdominal pain: No  Bloating: No  Constipation: Yes  Diarrhea: No  Blood in stool: No  Black stools: No  Rectal or Anal pain: No  Fecal incontinence: No  Yellowing of skin or eyes: No  Vomit with blood: No  Change in stools: No  Back pain: Yes  Muscle aches: Yes  Neck pain: Yes  Joint pain: Yes  Bone pain: No  Muscle cramps: No  Muscle weakness: No  Joint stiffness: Yes  Bone fracture: No  Nervous or " Anxious: Yes  Depression: Yes  Trouble sleeping: Yes  Trouble thinking or concentrating: Yes  Mood changes: Yes  Panic attacks: No

## 2019-03-19 NOTE — LETTER
Date:March 20, 2019      Patient was self referred, no letter generated. Do not send.        [facility] Health Information

## 2019-04-09 ENCOUNTER — TELEPHONE (OUTPATIENT)
Dept: RHEUMATOLOGY | Facility: CLINIC | Age: 34
End: 2019-04-09

## 2019-04-09 NOTE — TELEPHONE ENCOUNTER
[facility] Call Center    Phone Message    May a detailed message be left on voicemail: yes    Reason for Call: Other: Pt called in with a new referral for Myalgia. Writer did explain the intake process and wait time. Please follow up when available.     Action Taken: Message routed to:  [facility] ([facility] Rheum

## 2019-04-09 NOTE — TELEPHONE ENCOUNTER
FUTURE VISIT INFORMATION      FUTURE VISIT INFORMATION:    Date: 4/16/19    Time: 3PM    [place] [facility] [place]  REFERRAL INFORMATION:    Referring provider:  Dr. Dina Kasper    Referring providers clinic:   Pulm    Reason for visit/diagnosis:  Gastroesophageal reflux disease, esophagitis presence not specified    NOTES STATUS DETAILS   OFFICE NOTE from referring provider  Internal 3/19/19   OFFICE NOTE from other specialist   N/A    DISCHARGE SUMMARY FROM HOSPITAL N/A    DISCHARGE REPORT FROM ED N/A    OPERATIVE REPORT  N/A    PFC REPORT N/A    MEDICATION LIST Internal    LABS     FIT/STOOL TESTING N/A    PERTINENT LABS Internal    PATHOLOGY REPORTS RELATED TO DIAGNOSIS N/A    DIAGNOSTIC PROCEDURES     COLONOSCOPY N/A    ENDOSCOPY (EGD) N/A    ERCP N/A    EUS N/A    FLEX SIGMOIDOSCOPY N/A    IMAGING & REPORT      CT, MRI, US, XR Internal

## 2019-04-11 ENCOUNTER — TELEPHONE (OUTPATIENT)
Dept: GASTROENTEROLOGY | Facility: CLINIC | Age: 34
End: 2019-04-11

## 2019-04-11 NOTE — TELEPHONE ENCOUNTER
Called and left message for patient reminding of appointment scheduled on 4/16/19 at 3pm with [facility]. Patient to arrive 15 min early. To reschedule or cancel patient to call [phone].    SUNIL Corey

## 2019-04-15 NOTE — PROGRESS NOTES
"GI CLINIC VISIT - NEW PATIENT    CC/REFERRING PROVIDER: Felicia Kasper  REASON FOR CONSULTATION: GERD    HPI: 33 year old male with PMH of OCD, depression, GERD presenting to GI clinic for GERD.     He notes that he has had reflux for \"as long as he can remember.\" 3 years ago, he developed a chronic cough with worsening shortness of breath for which he has seen pulmonology (see below for summary). Over this time, he has also noted worsening symptoms that he attributes to GERD. He describes his symptoms as a discomfort with pressure in his lower mid chest that occurs after eating, occurring daily after every meal. Isn't sure how long it lasts as he eats small frequent meals, but thinks hours. Does not notice symptoms in AM after sleep. He also has intermittent sour acid taste in the back of his mouth. Has tried omeprazole daily for 1 month 2 years ago without improvement, PRN ranitidine without improvement, and more recently Tums without much improvement. Eating small meals with some improvement, cut out coffee without much change.     No N/V, dysphagia, odynophagia, black/bloody stool, weight changes. Has 2 formed BMs/day. Occasional abdominal cramping/bloating, improved since stopping milk a couple of months ago.     ROS + for hours of AM joint stiffness diffuse and lower back and upper neck pain. No hot/swollen joints. Negative for rashes, mouth ulcers.     Prior visits:   12/2018 - Pulm clinic for chronic cough. Also reported heartburn, fatigue, join pains. Reportedly had tried PPI in past. Recommended H2 blocker.    3/2019 - Pulm clinic f/u for unexplained cough/dyspnea. Recommend GI eval for GERD given no relief with omeprazole in past. Considered metacholine challenge for asthma.     No labs done previously.    Imaging:   CXR 12/2018 normal  MRI L spine 9/2013 without abnormality    ROS: 10pt ROS performed and otherwise negative.    PAST MEDICAL HISTORY:  Past Medical History:   Diagnosis Date     " Depressive disorder      Gastroesophageal reflux disease      OCD (obsessive compulsive disorder)      PREVIOUS ABDOMINAL/GYNECOLOGIC SURGERIES:    Past Surgical History:   Procedure Laterality Date     GENITOURINARY SURGERY     Testicular torsion  Inguinal hernia repair      PERTINENT MEDICATIONS:  Current Outpatient Medications   Medication Sig Dispense Refill     omeprazole (PRILOSEC) 40 MG DR capsule Take 1 capsule (40 mg) by mouth daily 90 capsule 1     SERTRALINE HCL PO Take 75 mg by mouth daily       mometasone (ASMANEX) 220 MCG/INH inhaler Inhale 1 puff into the lungs every evening (Patient not taking: Reported on 4/16/2019) 1 Inhaler 3     montelukast (SINGULAIR) 10 MG tablet Take 1 tablet (10 mg) by mouth every evening (Patient not taking: Reported on 4/16/2019) 30 tablet 11     OMEPRAZOLE PO Take by mouth daily         No other NSAID/anticoagulation reported by patient.  No other OTC/herbal/supplements reported by patient.    SOCIAL HISTORY:  Smoking: None  EtOH: Rare  No drugs  Post-doc here working in CF research  Social History     Socioeconomic History     Marital status:      Spouse name: Not on file     Number of children: Not on file     Years of education: Not on file     Highest education level: Not on file   Occupational History     Not on file   Social Needs     Financial resource strain: Not on file     Food insecurity:     Worry: Not on file     Inability: Not on file     Transportation needs:     Medical: Not on file     Non-medical: Not on file   Tobacco Use     Smoking status: Never Smoker     Smokeless tobacco: Never Used   Substance and Sexual Activity     Alcohol use: Yes     Comment: rare     Drug use: No     Sexual activity: Not on file   Lifestyle     Physical activity:     Days per week: Not on file     Minutes per session: Not on file     Stress: Not on file   Relationships     Social connections:     Talks on phone: Not on file     Gets together: Not on file     Attends  "Quaker service: Not on file     Active member of club or organization: Not on file     Attends meetings of clubs or organizations: Not on file     Relationship status: Not on file     Intimate partner violence:     Fear of current or ex partner: Not on file     Emotionally abused: Not on file     Physically abused: Not on file     Forced sexual activity: Not on file   Other Topics Concern     Not on file   Social History Narrative     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       FAMILY HISTORY:  Mom - Crohn's  Sister- Crohn's  Everyone in family has reflux  Family History   Problem Relation Age of Onset     LUNG DISEASE Mother         idiopathic lung disease treated with steroids     Neuromuscular Disease Father         [place] associated with chronic Lyme     Lung Cancer No family hx of        PHYSICAL EXAMINATION:  Vitals reviewed  /77 (BP Location: Left arm, Patient Position: Sitting, Cuff Size: Adult Regular)   Pulse 61   Temp 98.1  F (36.7  C) (Oral)   Resp 16   Ht 1.815 m (5' 11.46\")   Wt 87 kg (191 lb 14.4 oz)   SpO2 98%   BMI 26.42 kg/m    Gen: aaox3, cooperative, pleasant, not diaphoretic, nad  HEENT: ncat, op w/o ulcer/exudate, anicteric, mmm  Neck: no lymphadenopathy  Resp: breathing comfortably on RA, CTAB  CV: RRR  Abd: soft, NTND. BS+. No organomegaly  Ext: no c/c/e  Skin: warm, perfused, no jaundice  Neuro: grossly intact    PERTINENT STUDIES Reviewed in EMR    ASSESSMENT/PLAN: 33 year old male with PMH of OCD, depression, GERD and chronic cough for 3 years referred to GI clinic from pulmonary clinic for GERD. He has progressive daily post-prandial lower chest discomfort with sour/acid taste intermittently in mouth. Suspect these symptoms may be related to ongoing GERD, although it is difficult to definitely correlate his cough to these symptoms. Given this, will recommend a trial of therapy with PPI (omeprazole 40 mg in AM 30-60 minutes before breakfast) and monitor for symptomatic " improvement. He has no red flags to warrant urgent EGD, however does have risk factors for Rahman's (male, white, >10 years of GERD) that warrants screening with EGD, which will have dual benefit of evaluating for erosive esophagitis secondary to GERD.     Plan:   - Start PPI daily  - EGD   - RTC in 3 months     Thank you for this consultation. It was a pleasure to participate in the care of this patient; please contact us with any further questions.    Discussed with Dr. Maximiliano Pickett MD  GI Fellow  p [phone]

## 2019-04-16 ENCOUNTER — TELEPHONE (OUTPATIENT)
Dept: GASTROENTEROLOGY | Facility: CLINIC | Age: 34
End: 2019-04-16

## 2019-04-16 ENCOUNTER — PRE VISIT (OUTPATIENT)
Dept: GASTROENTEROLOGY | Facility: CLINIC | Age: 34
End: 2019-04-16

## 2019-04-16 ENCOUNTER — OFFICE VISIT (OUTPATIENT)
Dept: GASTROENTEROLOGY | Facility: CLINIC | Age: 34
End: 2019-04-16
Attending: INTERNAL MEDICINE
Payer: COMMERCIAL

## 2019-04-16 VITALS
OXYGEN SATURATION: 98 % | RESPIRATION RATE: 16 BRPM | HEART RATE: 61 BPM | HEIGHT: 71 IN | SYSTOLIC BLOOD PRESSURE: 140 MMHG | TEMPERATURE: 98.1 F | BODY MASS INDEX: 26.86 KG/M2 | DIASTOLIC BLOOD PRESSURE: 77 MMHG | WEIGHT: 191.9 LBS

## 2019-04-16 DIAGNOSIS — K21.9 GASTROESOPHAGEAL REFLUX DISEASE, ESOPHAGITIS PRESENCE NOT SPECIFIED: Primary | ICD-10-CM

## 2019-04-16 RX ORDER — OMEPRAZOLE 40 MG/1
40 CAPSULE, DELAYED RELEASE ORAL DAILY
Qty: 90 CAPSULE | Refills: 1 | Status: SHIPPED | OUTPATIENT
Start: 2019-04-16 | End: 2019-08-20

## 2019-04-16 ASSESSMENT — ENCOUNTER SYMPTOMS
BACK PAIN: 1
MUSCLE WEAKNESS: 0
COUGH: 0
PANIC: 1
NAUSEA: 0
HEMOPTYSIS: 0
BLOATING: 1
NECK PAIN: 1
DEPRESSION: 1
ARTHRALGIAS: 1
WHEEZING: 0
POSTURAL DYSPNEA: 1
COUGH DISTURBING SLEEP: 0
VOMITING: 0
SNORES LOUDLY: 0
RECTAL PAIN: 0
CONSTIPATION: 0
DYSPNEA ON EXERTION: 1
HEARTBURN: 1
SHORTNESS OF BREATH: 1
MYALGIAS: 0
SPUTUM PRODUCTION: 0
DIARRHEA: 0
ABDOMINAL PAIN: 0
STIFFNESS: 1
DECREASED CONCENTRATION: 1
INSOMNIA: 1
JAUNDICE: 0
JOINT SWELLING: 0
BOWEL INCONTINENCE: 0
BLOOD IN STOOL: 0
MUSCLE CRAMPS: 0
NERVOUS/ANXIOUS: 1

## 2019-04-16 ASSESSMENT — MIFFLIN-ST. JEOR: SCORE: 1849.83

## 2019-04-16 ASSESSMENT — PAIN SCALES - GENERAL: PAINLEVEL: NO PAIN (0)

## 2019-04-16 NOTE — TELEPHONE ENCOUNTER
Referring Provider: Stella Viera MD-Campbell, Dez Hines MD  What is your current height? 5'11    What is your current weight?191    Are you on daily home oxygen? no    Have you had a heart or lung transplant? no      In the past year, have you had any heart related issues  Including cardiomyopathy or a MI within 6 months, that required cardiac stenting or other implantable devices? no    What type of implantable device do you have? no    Do you take nitroglycerin? If yes, how often? no    Are you currently taking any blood thinners?no      (Females) Are you currently pregnant? no  If yes, how many weeks?      Have you had a procedure in the past that was difficult to tolerate with conscious sedation? Any allergies to Fentanyl or Versed no        Do you currently use any of the following?    Are you taking any scheduled prescription narcotics more than once daily? no    Drink alcohol daily? no    Currently using any street drugs or methadone?no    Do you have any history of post-traumatic stress syndrome or mental health issues? no

## 2019-04-16 NOTE — LETTER
"4/16/2019       RE: Tima Biswas  [address] [facility] [address]     Dear Colleague,    Thank you for referring your patient, Tima Biswas, to the [facility] GASTROENTEROLOGY AND IBD CLINIC at [facility]. Please see a copy of my visit note below.    GI CLINIC VISIT - NEW PATIENT    CC/REFERRING PROVIDER: Felicia Kasper  REASON FOR CONSULTATION: GERD    HPI: 33 year old male with PMH of OCD, depression, GERD presenting to GI clinic for GERD.     He notes that he has had reflux for \"as long as he can remember.\" 3 years ago, he developed a chronic cough with worsening shortness of breath for which he has seen pulmonology (see below for summary). Over this time, he has also noted worsening symptoms that he attributes to GERD. He describes his symptoms as a discomfort with pressure in his lower mid chest that occurs after eating, occurring daily after every meal. Isn't sure how long it lasts as he eats small frequent meals, but thinks hours. Does not notice symptoms in AM after sleep. He also has intermittent sour acid taste in the back of his mouth. Has tried omeprazole daily for 1 month 2 years ago without improvement, PRN ranitidine without improvement, and more recently Tums without much improvement. Eating small meals with some improvement, cut out coffee without much change.     No N/V, dysphagia, odynophagia, black/bloody stool, weight changes. Has 2 formed BMs/day. Occasional abdominal cramping/bloating, improved since stopping milk a couple of months ago.     ROS + for hours of AM joint stiffness diffuse and lower back and upper neck pain. No hot/swollen joints. Negative for rashes, mouth ulcers.     Prior visits:   12/2018 - Pulm clinic for chronic cough. Also reported heartburn, fatigue, join pains. Reportedly had tried PPI in past. Recommended H2 blocker.    3/2019 - Pulm clinic f/u for unexplained cough/dyspnea. Recommend GI eval for GERD given no relief " with omeprazole in past. Considered metacholine challenge for asthma.     No labs done previously.    Imaging:   CXR 12/2018 normal  MRI L spine 9/2013 without abnormality    ROS: 10pt ROS performed and otherwise negative.    PAST MEDICAL HISTORY:  Past Medical History:   Diagnosis Date     Depressive disorder      Gastroesophageal reflux disease      OCD (obsessive compulsive disorder)      PREVIOUS ABDOMINAL/GYNECOLOGIC SURGERIES:    Past Surgical History:   Procedure Laterality Date     GENITOURINARY SURGERY     Testicular torsion  Inguinal hernia repair      PERTINENT MEDICATIONS:  Current Outpatient Medications   Medication Sig Dispense Refill     omeprazole (PRILOSEC) 40 MG DR capsule Take 1 capsule (40 mg) by mouth daily 90 capsule 1     SERTRALINE HCL PO Take 75 mg by mouth daily       mometasone (ASMANEX) 220 MCG/INH inhaler Inhale 1 puff into the lungs every evening (Patient not taking: Reported on 4/16/2019) 1 Inhaler 3     montelukast (SINGULAIR) 10 MG tablet Take 1 tablet (10 mg) by mouth every evening (Patient not taking: Reported on 4/16/2019) 30 tablet 11     OMEPRAZOLE PO Take by mouth daily         No other NSAID/anticoagulation reported by patient.  No other OTC/herbal/supplements reported by patient.    SOCIAL HISTORY:  Smoking: None  EtOH: Rare  No drugs  Post-doc here working in CF research  Social History     Socioeconomic History     Marital status:      Spouse name: Not on file     Number of children: Not on file     Years of education: Not on file     Highest education level: Not on file   Occupational History     Not on file   Social Needs     Financial resource strain: Not on file     Food insecurity:     Worry: Not on file     Inability: Not on file     Transportation needs:     Medical: Not on file     Non-medical: Not on file   Tobacco Use     Smoking status: Never Smoker     Smokeless tobacco: Never Used   Substance and Sexual Activity     Alcohol use: Yes     Comment: rare      "Drug use: No     Sexual activity: Not on file   Lifestyle     Physical activity:     Days per week: Not on file     Minutes per session: Not on file     Stress: Not on file   Relationships     Social connections:     Talks on phone: Not on file     Gets together: Not on file     Attends Latter day service: Not on file     Active member of club or organization: Not on file     Attends meetings of clubs or organizations: Not on file     Relationship status: Not on file     Intimate partner violence:     Fear of current or ex partner: Not on file     Emotionally abused: Not on file     Physically abused: Not on file     Forced sexual activity: Not on file   Other Topics Concern     Not on file   Social History Narrative     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       FAMILY HISTORY:  Mom - Crohn's  Sister- Crohn's  Everyone in family has reflux  Family History   Problem Relation Age of Onset     LUNG DISEASE Mother         idiopathic lung disease treated with steroids     Neuromuscular Disease Father         Guillan [place] associated with chronic Lyme     Lung Cancer No family hx of        PHYSICAL EXAMINATION:  Vitals reviewed  /77 (BP Location: Left arm, Patient Position: Sitting, Cuff Size: Adult Regular)   Pulse 61   Temp 98.1  F (36.7  C) (Oral)   Resp 16   Ht 1.815 m (5' 11.46\")   Wt 87 kg (191 lb 14.4 oz)   SpO2 98%   BMI 26.42 kg/m     Gen: aaox3, cooperative, pleasant, not diaphoretic, nad  HEENT: ncat, op w/o ulcer/exudate, anicteric, mmm  Neck: no lymphadenopathy  Resp: breathing comfortably on RA, CTAB  CV: RRR  Abd: soft, NTND. BS+. No organomegaly  Ext: no c/c/e  Skin: warm, perfused, no jaundice  Neuro: grossly intact    PERTINENT STUDIES Reviewed in EMR    ASSESSMENT/PLAN: 33 year old male with PMH of OCD, depression, GERD and chronic cough for 3 years referred to GI clinic from pulmonary clinic for GERD. He has progressive daily post-prandial lower chest discomfort with sour/acid taste " intermittently in mouth. Suspect these symptoms may be related to ongoing GERD, although it is difficult to definitely correlate his cough to these symptoms. Given this, will recommend a trial of therapy with PPI (omeprazole 40 mg in AM 30-60 minutes before breakfast) and monitor for symptomatic improvement. He has no red flags to warrant urgent EGD, however does have risk factors for Rahman's (male, white, >10 years of GERD) that warrants screening with EGD, which will have dual benefit of evaluating for erosive esophagitis secondary to GERD.     Plan:   - Start PPI daily  - EGD   - RTC in 3 months     Thank you for this consultation. It was a pleasure to participate in the care of this patient; please contact us with any further questions.    Discussed with Dr. Maximiliano Pickett MD  GI Fellow  p [phone]    I performed a history and physical examination of the above patient and discussed the management with Dr. Pickett on 4/15/2019. I reviewed the note and there are no changes to the past medical, family or social history.  A complete 10 point review of systems was obtained. Please see the HPI for pertinent positives and negatives. All other systems were reviewed and were found to be negative.     I agree with the documented findings and plan of care as outlined.    Stella Viera MD  GI Attending  Pager: [phone]

## 2019-04-16 NOTE — PATIENT INSTRUCTIONS
- Schedule EGD with Dr. Viera  You are scheduled on May 6   Check in time 7 am   [address]   5th floor  You will be mailed the instructions  You will receive a call from a pre assessment nurse about one week prior to exam to go over instructions       - Start omeprazole 40 mg daily in AM, 30-60 minutes before breakfast      - F/u 3 months         For questions regarding your care Monday through Friday, contact the RN GI care coordinator,  Call   [phone] . Your call will be  returned same day, or if consultation is needed with the provider, it may be following business day - or you may send a My Chart message.    For medication refills (prescribed by the GI clinic), contact your pharmacy.    For appointment rescheduling/cancellation, contact [phone]     After hours, or if you have an immediate GI concern and cannot wait for a return call, contact the GI Fellow at [phone] and select option #4.     Thanks Megan Maddox RN Care Coordinator  Phone   [phone]

## 2019-04-18 NOTE — TELEPHONE ENCOUNTER
Call was placed to patient regarding the referral without success.  Message was left asking patient to call the clinic to discuss the referral. Awaiting a call back from the patient.  Jaz Ji CMA  4/18/2019 3:13 PM

## 2019-04-22 NOTE — PROGRESS NOTES
I performed a history and physical examination of the above patient and discussed the management with Dr. Pickett on 4/15/2019. I reviewed the note and there are no changes to the past medical, family or social history.  A complete 10 point review of systems was obtained. Please see the HPI for pertinent positives and negatives. All other systems were reviewed and were found to be negative.     I agree with the documented findings and plan of care as outlined.    Stella Viera MD  GI Attending  Pager: [phone]

## 2019-04-23 NOTE — TELEPHONE ENCOUNTER
Scleroderma/Myositis Patient Intake Form    What is the name of the referring physician? Felicia Kasper MD    Have you been diagnosed with Scleroderma/Myositis/Dermatomyositis? No, referral is for myalgia    Have you seen a Rheumatologist in the past?  No    Have you ever been in the hospital due to Scleroderma? no    Have you been diagnosed with Fibromyalgia? no    Who manages your care for this issue now? Dr. Kasper     Have you ever been diagnosed with a lung condition? no    Have you ever been diagnosed with a heart condition? no    What is your most urgent concern at this time? Chronic aches and pains located joints in knees, hands, upper back and neck    Have you ever had any of the following tests? Where and when?      Upper endoscopy:  no    Chest x-ray: yes at [facility]    High resolution CT scan: no    Pulmonary function test:  Yes at [facility]    EKG: no    Right heart Cath: no    ECHO cardiogram:  no    Barium swallow:  no    Have you ever seen the following specialists: Where and when?      Pulmonologist: yes at [facility]    Gastroenterologist: yes at [facility]    Cardiologist: not recently    Dermatologist: no    Explained to patient that I will pass this information onto the nurse who will contact them to complete a detailed health history form which is used to determine urgency and provider for scheduling. Patient verbalized understanding.       Jaz Ji CMA  4/23/2019 4:22 PM

## 2019-04-29 ENCOUNTER — TELEPHONE (OUTPATIENT)
Dept: GASTROENTEROLOGY | Facility: CLINIC | Age: 34
End: 2019-04-29

## 2019-04-29 NOTE — TELEPHONE ENCOUNTER
Order Questions     Question Answer Comment   Procedure Upper GI Endoscopy Dr. Viera please   Purpose of Procedure Diagnostic Gastroesophageal reflux disease, esophagitis presence not specified    Does the patient have the following? None    Is the patient on the following medications? No blood thinning agents    Preferred Location [facility] [phone]        Referring MD:  Felicia Kasper MD             with request pt contact Endoscopy Pre-assessment RN to review upcoming procedure [facility] Endoscopy information.      Telephone call-back number provided. David Roldan RN    Additional Information regarding appointment:   Date/Arrival time: Monday May 6th  @7:45 am  Procedure Provider:  Dr. Viera  Referring Provider. Felicia Kasper  Prep Type:   []Golytely eRx: (not sent)  [x]NPO /p MN, No solid food /p 2200 the night before  Facility location:    [][address], 1st Floor, [address]  [x][address] [facility], 5th floor   Anticoagulants or blood thinners: no

## 2019-05-03 ENCOUNTER — ANESTHESIA EVENT (OUTPATIENT)
Dept: SURGERY | Facility: AMBULATORY SURGERY CENTER | Age: 34
End: 2019-05-03

## 2019-05-06 ENCOUNTER — ANESTHESIA (OUTPATIENT)
Dept: SURGERY | Facility: AMBULATORY SURGERY CENTER | Age: 34
End: 2019-05-06

## 2019-05-06 ENCOUNTER — HOSPITAL ENCOUNTER (OUTPATIENT)
Facility: AMBULATORY SURGERY CENTER | Age: 34
End: 2019-05-06
Attending: INTERNAL MEDICINE
Payer: COMMERCIAL

## 2019-05-06 VITALS
OXYGEN SATURATION: 96 % | HEIGHT: 71 IN | WEIGHT: 191.14 LBS | BODY MASS INDEX: 26.76 KG/M2 | DIASTOLIC BLOOD PRESSURE: 72 MMHG | TEMPERATURE: 98 F | SYSTOLIC BLOOD PRESSURE: 121 MMHG | HEART RATE: 81 BPM | RESPIRATION RATE: 16 BRPM

## 2019-05-06 LAB — UPPER GI ENDOSCOPY: NORMAL

## 2019-05-06 RX ORDER — SODIUM CHLORIDE, SODIUM LACTATE, POTASSIUM CHLORIDE, CALCIUM CHLORIDE 600; 310; 30; 20 MG/100ML; MG/100ML; MG/100ML; MG/100ML
INJECTION, SOLUTION INTRAVENOUS CONTINUOUS
Status: DISCONTINUED | OUTPATIENT
Start: 2019-05-06 | End: 2019-05-07 | Stop reason: HOSPADM

## 2019-05-06 RX ORDER — SIMETHICONE
LIQUID (ML) MISCELLANEOUS PRN
Status: DISCONTINUED | OUTPATIENT
Start: 2019-05-06 | End: 2019-05-06 | Stop reason: HOSPADM

## 2019-05-06 RX ORDER — ONDANSETRON 4 MG/1
4 TABLET, ORALLY DISINTEGRATING ORAL EVERY 30 MIN PRN
Status: DISCONTINUED | OUTPATIENT
Start: 2019-05-06 | End: 2019-05-07 | Stop reason: HOSPADM

## 2019-05-06 RX ORDER — OXYCODONE HYDROCHLORIDE 5 MG/1
5 TABLET ORAL EVERY 4 HOURS PRN
Status: DISCONTINUED | OUTPATIENT
Start: 2019-05-06 | End: 2019-05-07 | Stop reason: HOSPADM

## 2019-05-06 RX ORDER — ONDANSETRON 2 MG/ML
4 INJECTION INTRAMUSCULAR; INTRAVENOUS EVERY 30 MIN PRN
Status: DISCONTINUED | OUTPATIENT
Start: 2019-05-06 | End: 2019-05-07 | Stop reason: HOSPADM

## 2019-05-06 RX ORDER — ACETAMINOPHEN 325 MG/1
975 TABLET ORAL ONCE
Status: DISCONTINUED | OUTPATIENT
Start: 2019-05-06 | End: 2019-05-07 | Stop reason: HOSPADM

## 2019-05-06 RX ORDER — ONDANSETRON 2 MG/ML
4 INJECTION INTRAMUSCULAR; INTRAVENOUS
Status: DISCONTINUED | OUTPATIENT
Start: 2019-05-06 | End: 2019-05-07 | Stop reason: HOSPADM

## 2019-05-06 RX ORDER — FENTANYL CITRATE 50 UG/ML
INJECTION, SOLUTION INTRAMUSCULAR; INTRAVENOUS PRN
Status: DISCONTINUED | OUTPATIENT
Start: 2019-05-06 | End: 2019-05-06 | Stop reason: HOSPADM

## 2019-05-06 RX ORDER — LIDOCAINE 40 MG/G
CREAM TOPICAL
Status: DISCONTINUED | OUTPATIENT
Start: 2019-05-06 | End: 2019-05-07 | Stop reason: HOSPADM

## 2019-05-06 RX ORDER — FENTANYL CITRATE 50 UG/ML
25-50 INJECTION, SOLUTION INTRAMUSCULAR; INTRAVENOUS
Status: DISCONTINUED | OUTPATIENT
Start: 2019-05-06 | End: 2019-05-07 | Stop reason: HOSPADM

## 2019-05-06 RX ORDER — MEPERIDINE HYDROCHLORIDE 25 MG/ML
12.5 INJECTION INTRAMUSCULAR; INTRAVENOUS; SUBCUTANEOUS
Status: DISCONTINUED | OUTPATIENT
Start: 2019-05-06 | End: 2019-05-07 | Stop reason: HOSPADM

## 2019-05-06 RX ORDER — NALOXONE HYDROCHLORIDE 0.4 MG/ML
.1-.4 INJECTION, SOLUTION INTRAMUSCULAR; INTRAVENOUS; SUBCUTANEOUS
Status: DISCONTINUED | OUTPATIENT
Start: 2019-05-06 | End: 2019-05-07 | Stop reason: HOSPADM

## 2019-05-06 ASSESSMENT — MIFFLIN-ST. JEOR: SCORE: 1846.44

## 2019-05-09 LAB — COPATH REPORT: NORMAL

## 2019-06-05 NOTE — TELEPHONE ENCOUNTER
Returned call to pt to complete a Scleroderma/Myositis intake form. Pt reports he is wanting to be evaluated for Lyme's. Suggested that he see ID first but pt wants to start in Rheumatology. He has been having joint pains and HA for years. 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 Routed note to Jaz to help schedule an appointment with one of the rheumatologists.    JOHN Brown RN  Rheumatology RN Coordinator   [place]

## 2019-06-05 NOTE — TELEPHONE ENCOUNTER
Left a message for patient to call back regarding scheduling an appointment. I am awaiting a call back from patient.   Jaz Ji CMA  6/5/2019 2:53 PM

## 2019-06-06 NOTE — TELEPHONE ENCOUNTER
Another attempt made to reach patient regarding scheduling an appointment. Message left asking patient to return my call.   Jaz Ji CMA  6/6/2019 2:54 PM

## 2019-06-07 NOTE — TELEPHONE ENCOUNTER
Offered an appointment on 6/22/2019 at 8am with Dr. Osmna, patient accepted and was instructed to arrive 15 minutes prior to appointment and asked to bring a current medication list. Patient verbalized understanding. Task sent to Clinic Coordinators to schedule appointment.   Jaz Ji CMA  6/7/2019 11:26 AM

## 2019-06-22 ENCOUNTER — OFFICE VISIT (OUTPATIENT)
Dept: RHEUMATOLOGY | Facility: CLINIC | Age: 34
End: 2019-06-22
Attending: INTERNAL MEDICINE
Payer: COMMERCIAL

## 2019-06-22 VITALS
DIASTOLIC BLOOD PRESSURE: 74 MMHG | HEART RATE: 62 BPM | BODY MASS INDEX: 26.07 KG/M2 | TEMPERATURE: 97.9 F | WEIGHT: 186.2 LBS | HEIGHT: 71 IN | RESPIRATION RATE: 18 BRPM | SYSTOLIC BLOOD PRESSURE: 128 MMHG

## 2019-06-22 DIAGNOSIS — M25.561 CHRONIC PAIN OF BOTH KNEES: Primary | ICD-10-CM

## 2019-06-22 DIAGNOSIS — G89.29 CHRONIC PAIN OF BOTH KNEES: Primary | ICD-10-CM

## 2019-06-22 DIAGNOSIS — G89.29 CHRONIC PAIN OF BOTH KNEES: ICD-10-CM

## 2019-06-22 DIAGNOSIS — M79.10 MYALGIA: ICD-10-CM

## 2019-06-22 DIAGNOSIS — M25.561 CHRONIC PAIN OF BOTH KNEES: ICD-10-CM

## 2019-06-22 DIAGNOSIS — M25.562 CHRONIC PAIN OF BOTH KNEES: ICD-10-CM

## 2019-06-22 DIAGNOSIS — M25.562 CHRONIC PAIN OF BOTH KNEES: Primary | ICD-10-CM

## 2019-06-22 LAB
CREAT SERPL-MCNC: 1.2 MG/DL (ref 0.66–1.25)
CRP SERPL-MCNC: <2.9 MG/L (ref 0–8)
ERYTHROCYTE [DISTWIDTH] IN BLOOD BY AUTOMATED COUNT: 12.1 % (ref 10–15)
GFR SERPL CREATININE-BSD FRML MDRD: 79 ML/MIN/{1.73_M2}
HCT VFR BLD AUTO: 46.1 % (ref 40–53)
HGB BLD-MCNC: 15.2 G/DL (ref 13.3–17.7)
MCH RBC QN AUTO: 30.4 PG (ref 26.5–33)
MCHC RBC AUTO-ENTMCNC: 33 G/DL (ref 31.5–36.5)
MCV RBC AUTO: 92 FL (ref 78–100)
PLATELET # BLD AUTO: 181 10E9/L (ref 150–450)
RBC # BLD AUTO: 5 10E12/L (ref 4.4–5.9)
TSH SERPL DL<=0.005 MIU/L-ACNC: 1.58 MU/L (ref 0.4–4)
WBC # BLD AUTO: 5.9 10E9/L (ref 4–11)

## 2019-06-22 PROCEDURE — 86431 RHEUMATOID FACTOR QUANT: CPT | Performed by: INTERNAL MEDICINE

## 2019-06-22 PROCEDURE — 36415 COLL VENOUS BLD VENIPUNCTURE: CPT | Performed by: INTERNAL MEDICINE

## 2019-06-22 PROCEDURE — 84443 ASSAY THYROID STIM HORMONE: CPT | Performed by: INTERNAL MEDICINE

## 2019-06-22 PROCEDURE — 86200 CCP ANTIBODY: CPT | Performed by: INTERNAL MEDICINE

## 2019-06-22 PROCEDURE — 86140 C-REACTIVE PROTEIN: CPT | Performed by: INTERNAL MEDICINE

## 2019-06-22 PROCEDURE — 85027 COMPLETE CBC AUTOMATED: CPT | Performed by: INTERNAL MEDICINE

## 2019-06-22 PROCEDURE — 86039 ANTINUCLEAR ANTIBODIES (ANA): CPT | Performed by: INTERNAL MEDICINE

## 2019-06-22 PROCEDURE — G0463 HOSPITAL OUTPT CLINIC VISIT: HCPCS | Mod: ZF

## 2019-06-22 PROCEDURE — 82565 ASSAY OF CREATININE: CPT | Performed by: INTERNAL MEDICINE

## 2019-06-22 PROCEDURE — 86038 ANTINUCLEAR ANTIBODIES: CPT | Performed by: INTERNAL MEDICINE

## 2019-06-22 ASSESSMENT — MIFFLIN-ST. JEOR: SCORE: 1824.03

## 2019-06-22 ASSESSMENT — PAIN SCALES - GENERAL: PAINLEVEL: NO PAIN (1)

## 2019-06-22 NOTE — NURSING NOTE
"Chief Complaint   Patient presents with     Consult     Lyme consult     Blood pressure 128/74, pulse 62, temperature 97.9  F (36.6  C), temperature source Oral, resp. rate 18, height 1.815 m (5' 11.46\"), weight 84.5 kg (186 lb 3.2 oz).    Marilu Iglesias CMA on 6/22/2019 at 8:00 AM    "

## 2019-06-22 NOTE — PATIENT INSTRUCTIONS
Dx:  1. Chronic knee pain, finger/hand stiffness    Plan:  1. Bloodwork for inflammation, rheumatic serologies, thyroid function

## 2019-06-22 NOTE — PROGRESS NOTES
[facility]  Rheumatology Clinic  Garth Osman MD  2019     Name: Tima Biswas  MRN: [id number]  Age: 33 year old  : 1985  Referring provider: Felicia Kasper     Assessment and Plan:  # Myalgia    # Chronic pain of both knees.  Low grade symmetrical predominant small joint morning stiffness and swelling, coupled with longstanding bilateral knee pain in a young man with a family history of autoimmunity.  Exam reveals scant, cool left knee effusion but is otherwise unremarkable.  Chest x-ray in 2018 was normal.  Lumbar spine MRI in  showed no foraminal narrowing or bony abnormality.  PFTs in 2018 were unremarkable.      Morning stiffness and symmetrical small and medium sized joint pain raise concern for inflammatory arthropathy. The history of vigorous, active lifestyle is reassuring, as is the normal musculoskeletal exam.  Nevertheless, I think screening for autoimmune and inflammatory conditions is warranted.  I will evaluate RA, lupus, and lyme serologies as well as inflammatory markers.  No further imaging is warranted.  I will be in contact with the patient regarding interpretation of lab results and possible therapeutic plans when all tests have returned.  I will arrange follow up depending on laboratory results.      Orders:  - CRP inflammation  - Creatinine  - CBC with platelets  - TSH  - Anti Nuclear Tianna IgG by IFA with Reflex  - Rheumatoid factor  - Cyclic Citrullinated Peptide Antibody IgG     Follow-up: depending on laboratory results       HPI:   Tima Biswas is a 33 year old male who presents for evaluation of myalgias, referred from pulmonology.  He last saw Dr. Kasper in pulmonology in 3/19/19 for follow up of chronic cough.  Noted a history of fatigue and knee pain with concern for infectious arthritis.  Lyme titer was ordered in 2018 but not completed.  His cough is somewhat cyclical and has actually been better recently.  He has had some  "shortness of breath, usually associated with his cough but also with exertion like climbing up stairs.         He notes chronic joint stiffness and pain.  For more than 5 years, he has bilateral knee pain and morning stiffness.  He usually gets up around 6 am and works out around 8 am.  Not until after his workout does his knee stiffness really resolve.  He has not noticed any knee swelling, redness, or warmth.    Both hands also feel stiff and \"tight.\"  He has not noticed any specific individual joint swelling but his hands do generally swell at times, often after doing more work with his hands.  He denies any weakness or lack of hand strength.  In the morning sometimes his hands are numb and this may also occur with prolonged use of his hands.  He denies any foot pain or swelling.    He also has back pain, mostly in his lower neck and through his shoulder blades.  This is fairly steady throughout the day although does not stop him from activity.  He does not typically take any medication for his pain.    He is active physically.  He rides his bike to work, 5 miles each way, and then works out for 30 - 60 minutes.  After work he often goes to the gym to do strength training.  On weekends he does yard work and remodeling.      His stools have been looser recently.  He has 3 bowel movements per day, which is his usual pattern.  He has some stools which are partially formed and some that are watery.  He has some abdominal bloating at times but denies any abdominal pain.  He does not feel this is related to diet as he eats the same things every day.  His appetite is good.  In the past 6 months he has lost about 5 pounds, although he is actually trying to gain weight.      He does sometimes wake from sleep in the middle of the night due to joint pain.  His wife tells him he is quite restless at night but she has not mentioned he snores.  He used to feel rested upon waking in the morning but now wakes still feeling " tired.  He denies any fevers, chills, or sweats.     He had some heart palpitations starting in early college.  He did have some workup for this however nothing specific was found.  He also notes some mid-sternal chest pain, often occurring with intense activity and associated with shortness of breath.      He is originally from [place], [place] and did lyme disease research in the field approximately 10 years ago therefore was exposed to ticks on a regular basis.  He also goes hiking/camping at least 6 times a year in the [facility] area.  His father recently had Guillain [place] related to lyme disease.     He has started to have occasional headaches, which is new for him.  Mainly these are from the top of his head radiating backwards into his neck.  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  He also has occasional pain over both temples which he relates to stress.  His headaches are not significant enough to cause him to stop activity.    He denies any vision changes or dry eyes.  Occasionally when walking down the snyder, he feels slightly off balance and feels like the world is moving, although he denies actual sense of things spinning.      He denies any family history of psoriasis.  He did have a patch of dry skin on the inside of his elbow intermittently as a child through high school.  His brother had similar skin issue.  He denies any change in color of his hands when exposed to cold.     He saw Dr. Pickett in GI on 4/16/19 for a complaint of gastroesophageal reflux.  Prior visits for chronic cough were noted and normal chest x-ray imaging from December 2018 was reviewed.  GERD was diagnosed and empiric treatment with Omeprazole was started.  EGD was planned.  This was 5/6/19 impression was of reactive gastropathy; no H pylori and no metaplasia.  Since starting Omeprazole, his heartburn has improved.    Review of Systems:   Pertinent items are noted in HPI or as below,  "remainder of complete ROS is negative.      No recent problems with hearing or vision. No swallowing problems.   No wheezing  No indigestion, abdominal pain, nausea, vomiting  No urination problems, no bloody, cloudy urine, no dysuria  No tingling, weakness  No confusion  No rashes. No easy bleeding or bruising.     Active Medications:      omeprazole (PRILOSEC) 40 MG DR capsule, Take 1 capsule (40 mg) by mouth daily, Disp: 90 capsule, Rfl: 1     SERTRALINE HCL PO, Take 75 mg by mouth daily, Disp: , Rfl:      mometasone (ASMANEX) 220 MCG/INH inhaler, Inhale 1 puff into the lungs every evening (Patient not taking: Reported on 4/16/2019), Disp: 1 Inhaler, Rfl: 3     montelukast (SINGULAIR) 10 MG tablet, Take 1 tablet (10 mg) by mouth every evening (Patient not taking: Reported on 4/16/2019), Disp: 30 tablet, Rfl: 11     OMEPRAZOLE PO, Take by mouth daily, Disp: , Rfl:       Allergies:   No known drug allergies.      Past Medical History:  Gastroesophageal reflux disease   Obsessive-compulsive disorder   Depressive disorder      Past Surgical History:  Esophagogastroduodenoscopy 5/6/19   surgery    Family History:   Idiopathic lung disease - mother  Guillain [place] disease - father   Celiac disease - sister    His sister has celiac disease.  His brother and sister have thyroid issues, although he does not know the details.  His mother has had general \"inflammation\" although nothing that has been officially diagnosed.  No family history of early onset diabetes.  Social History:   Presents to clinic alone.  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   Alcohol Use: Rare alcohol use.   Drug Use: No illicit drug use.   PCP: [facility]      Physical Exam:   /74   Pulse 62   Temp 97.9  F (36.6  C) (Oral)   Resp 18   Ht 1.815 m (5' 11.46\")   Wt 84.5 kg (186 lb 3.2 oz)   BMI 25.64 kg/m     Wt Readings from Last 4 Encounters:   06/22/19 " 84.5 kg (186 lb 3.2 oz)   05/06/19 86.7 kg (191 lb 2.2 oz)   04/16/19 87 kg (191 lb 14.4 oz)   03/19/19 86.2 kg (190 lb)     Constitutional: Well-developed, appearing stated age; cooperative  Eyes: Normal EOM, PERRLA, vision, conjunctiva, sclera  ENT: Normal external ears, nose, hearing, lips, teeth, gums, throat. No mucous membrane lesions, normal saliva pool  Neck: No mass or thyroid enlargement  Resp: Lungs clear to auscultation, nl to palpation  CV: RRR, no murmurs, rubs or gallops, no edema  GI: No ABD mass or tenderness, no HSM  : Not tested  Lymph: No cervical, supraclavicular, inguinal or epitrochlear nodes  MS: The TMJ, neck, shoulder, elbow, wrist, MCP/PIP/DIP, spine, hip, knee, ankle, and foot MTP/IP joints were examined and found normal. No active synovitis or altered joint anatomy. Full joint ROM. Normal  strength. No dactylitis, tenosynovitis, enthesopathy.  Knees are cool. No fluid wave. Scant effusion in the left knee.  Skin: No nail pitting, alopecia, rash, nodules or lesions. No evidence of psoriasis.  Neuro: Normal cranial nerves, strength, sensation, DTRs.   Psych: Normal judgement, orientation, memory, affect.     Scribe Disclosure:  SAUL, Rosa Carrillo, am serving as a scribe to document services personally performed by Garth Osman MD at this visit, based upon the provider's statements to me. All documentation has been reviewed by the aforementioned provider prior to being entered into the official medical record.

## 2019-06-22 NOTE — LETTER
2019       RE: Tima Biswas  [address] [facility] [address]     Dear Colleague,    Thank you for referring your patient, Tima Biswas, to the [facility] RHEUMATOLOGY at [facility]. Please see a copy of my visit note below.    [facility]  Rheumatology Clinic  Garth Osman MD  2019     Name: Tima Biswas  MRN: [id number]  Age: 33 year old  : 1985  Referring provider: Felicia Kasper     Assessment and Plan:  # Myalgia    # Chronic pain of both knees.  Low grade symmetrical predominant small joint morning stiffness and swelling, coupled with longstanding bilateral knee pain in a young man with a family history of autoimmunity.  Exam reveals scant, cool left knee effusion but is otherwise unremarkable.  Chest x-ray in 2018 was normal.  Lumbar spine MRI in  showed no foraminal narrowing or bony abnormality.  PFTs in 2018 were unremarkable.      Morning stiffness and symmetrical small and medium sized joint pain raise concern for inflammatory arthropathy. The history of vigorous, active lifestyle is reassuring, as is the normal musculoskeletal exam.  Nevertheless, I think screening for autoimmune and inflammatory conditions is warranted.  I will evaluate RA, lupus, and lyme serologies as well as inflammatory markers.  No further imaging is warranted.  I will be in contact with the patient regarding interpretation of lab results and possible therapeutic plans when all tests have returned.  I will arrange follow up depending on laboratory results.      Orders:  - CRP inflammation  - Creatinine  - CBC with platelets  - TSH  - Anti Nuclear Tianna IgG by IFA with Reflex  - Rheumatoid factor  - Cyclic Citrullinated Peptide Antibody IgG     Follow-up: depending on laboratory results       HPI:   Tima Biswas is a 33 year old male who presents for evaluation of myalgias, referred from pulmonology.  He last saw Dr. Kasper in  "pulmonology in 3/19/19 for follow up of chronic cough.  Noted a history of fatigue and knee pain with concern for infectious arthritis.  Lyme titer was ordered in December 2018 but not completed.  His cough is somewhat cyclical and has actually been better recently.  He has had some shortness of breath, usually associated with his cough but also with exertion like climbing up stairs.         He notes chronic joint stiffness and pain.  For more than 5 years, he has bilateral knee pain and morning stiffness.  He usually gets up around 6 am and works out around 8 am.  Not until after his workout does his knee stiffness really resolve.  He has not noticed any knee swelling, redness, or warmth.    Both hands also feel stiff and \"tight.\"  He has not noticed any specific individual joint swelling but his hands do generally swell at times, often after doing more work with his hands.  He denies any weakness or lack of hand strength.  In the morning sometimes his hands are numb and this may also occur with prolonged use of his hands.  He denies any foot pain or swelling.    He also has back pain, mostly in his lower neck and through his shoulder blades.  This is fairly steady throughout the day although does not stop him from activity.  He does not typically take any medication for his pain.    He is active physically.  He rides his bike to work, 5 miles each way, and then works out for 30 - 60 minutes.  After work he often goes to the gym to do strength training.  On weekends he does yard work and remodeling.      His stools have been looser recently.  He has 3 bowel movements per day, which is his usual pattern.  He has some stools which are partially formed and some that are watery.  He has some abdominal bloating at times but denies any abdominal pain.  He does not feel this is related to diet as he eats the same things every day.  His appetite is good.  In the past 6 months he has lost about 5 pounds, although he is " actually trying to gain weight.      He does sometimes wake from sleep in the middle of the night due to joint pain.  His wife tells him he is quite restless at night but she has not mentioned he snores.  He used to feel rested upon waking in the morning but now wakes still feeling tired.  He denies any fevers, chills, or sweats.     He had some heart palpitations starting in early college.  He did have some workup for this however nothing specific was found.  He also notes some mid-sternal chest pain, often occurring with intense activity and associated with shortness of breath.      He is originally from [place], [place] and did lyme disease research in the field approximately 10 years ago therefore was exposed to ticks on a regular basis.  He also goes hiking/camping at least 6 times a year in the [facility] area.  His father recently had Guillain Mason related to lyme disease.     He has started to have occasional headaches, which is new for him.  Mainly these are from the top of his head radiating backwards into his neck.  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  He also has occasional pain over both temples which he relates to stress.  His headaches are not significant enough to cause him to stop activity.    He denies any vision changes or dry eyes.  Occasionally when walking down the snyder, he feels slightly off balance and feels like the world is moving, although he denies actual sense of things spinning.      He denies any family history of psoriasis.  He did have a patch of dry skin on the inside of his elbow intermittently as a child through high school.  His brother had similar skin issue.  He denies any change in color of his hands when exposed to cold.     He saw Dr. Pickett in GI on 4/16/19 for a complaint of gastroesophageal reflux.  Prior visits for chronic cough were noted and normal chest x-ray imaging from December 2018 was reviewed.  GERD was diagnosed  "and empiric treatment with Omeprazole was started.  EGD was planned.  This was 5/6/19 impression was of reactive gastropathy; no H pylori and no metaplasia.  Since starting Omeprazole, his heartburn has improved.    Review of Systems:   Pertinent items are noted in HPI or as below, remainder of complete ROS is negative.      No recent problems with hearing or vision. No swallowing problems.   No wheezing  No indigestion, abdominal pain, nausea, vomiting  No urination problems, no bloody, cloudy urine, no dysuria  No tingling, weakness  No confusion  No rashes. No easy bleeding or bruising.     Active Medications:      omeprazole (PRILOSEC) 40 MG DR capsule, Take 1 capsule (40 mg) by mouth daily, Disp: 90 capsule, Rfl: 1     SERTRALINE HCL PO, Take 75 mg by mouth daily, Disp: , Rfl:      mometasone (ASMANEX) 220 MCG/INH inhaler, Inhale 1 puff into the lungs every evening (Patient not taking: Reported on 4/16/2019), Disp: 1 Inhaler, Rfl: 3     montelukast (SINGULAIR) 10 MG tablet, Take 1 tablet (10 mg) by mouth every evening (Patient not taking: Reported on 4/16/2019), Disp: 30 tablet, Rfl: 11     OMEPRAZOLE PO, Take by mouth daily, Disp: , Rfl:       Allergies:   No known drug allergies.      Past Medical History:  Gastroesophageal reflux disease   Obsessive-compulsive disorder   Depressive disorder      Past Surgical History:  Esophagogastroduodenoscopy 5/6/19   surgery    Family History:   Idiopathic lung disease - mother  Guillain Haworth disease - father   Celiac disease - sister    His sister has celiac disease.  His brother and sister have thyroid issues, although he does not know the details.  His mother has had general \"inflammation\" although nothing that has been officially diagnosed.  No family history of early onset diabetes.  Social History:   Presents to clinic alone.  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   Alcohol Use: Rare alcohol " "use.   Drug Use: No illicit drug use.   PCP: [facility]      Physical Exam:   /74   Pulse 62   Temp 97.9  F (36.6  C) (Oral)   Resp 18   Ht 1.815 m (5' 11.46\")   Wt 84.5 kg (186 lb 3.2 oz)   BMI 25.64 kg/m      Wt Readings from Last 4 Encounters:   06/22/19 84.5 kg (186 lb 3.2 oz)   05/06/19 86.7 kg (191 lb 2.2 oz)   04/16/19 87 kg (191 lb 14.4 oz)   03/19/19 86.2 kg (190 lb)     Constitutional: Well-developed, appearing stated age; cooperative  Eyes: Normal EOM, PERRLA, vision, conjunctiva, sclera  ENT: Normal external ears, nose, hearing, lips, teeth, gums, throat. No mucous membrane lesions, normal saliva pool  Neck: No mass or thyroid enlargement  Resp: Lungs clear to auscultation, nl to palpation  CV: RRR, no murmurs, rubs or gallops, no edema  GI: No ABD mass or tenderness, no HSM  : Not tested  Lymph: No cervical, supraclavicular, inguinal or epitrochlear nodes  MS: The TMJ, neck, shoulder, elbow, wrist, MCP/PIP/DIP, spine, hip, knee, ankle, and foot MTP/IP joints were examined and found normal. No active synovitis or altered joint anatomy. Full joint ROM. Normal  strength. No dactylitis, tenosynovitis, enthesopathy.  Knees are cool. No fluid wave. Scant effusion in the left knee.  Skin: No nail pitting, alopecia, rash, nodules or lesions. No evidence of psoriasis.  Neuro: Normal cranial nerves, strength, sensation, DTRs.   Psych: Normal judgement, orientation, memory, affect.     Scribe Disclosure:  I, Rosa Carrillo, am serving as a scribe to document services personally performed by Garth Osman MD at this visit, based upon the provider's statements to me. All documentation has been reviewed by the aforementioned provider prior to being entered into the official medical record.       Again, thank you for allowing me to participate in the care of your patient.      Sincerely,    Garth Osman MD      "

## 2019-06-22 NOTE — LETTER
2019      RE: Tima Biswas  [address] [facility] [address]       [facility]  Rheumatology Clinic  Garth Osman MD  2019     Name: Tima Biswas  MRN: [id number]  Age: 33 year old  : 1985  Referring provider: Felicia Kasper     Assessment and Plan:  # Myalgia    # Chronic pain of both knees.  Low grade symmetrical predominant small joint morning stiffness and swelling, coupled with longstanding bilateral knee pain in a young man with a family history of autoimmunity.  Exam reveals scant, cool left knee effusion but is otherwise unremarkable.  Chest x-ray in 2018 was normal.  Lumbar spine MRI in  showed no foraminal narrowing or bony abnormality.  PFTs in 2018 were unremarkable.      Morning stiffness and symmetrical small and medium sized joint pain raise concern for inflammatory arthropathy. The history of vigorous, active lifestyle is reassuring, as is the normal musculoskeletal exam.  Nevertheless, I think screening for autoimmune and inflammatory conditions is warranted.  I will evaluate RA, lupus, and lyme serologies as well as inflammatory markers.  No further imaging is warranted.  I will be in contact with the patient regarding interpretation of lab results and possible therapeutic plans when all tests have returned.  I will arrange follow up depending on laboratory results.      Orders:  - CRP inflammation  - Creatinine  - CBC with platelets  - TSH  - Anti Nuclear Tianna IgG by IFA with Reflex  - Rheumatoid factor  - Cyclic Citrullinated Peptide Antibody IgG     Follow-up: depending on laboratory results       HPI:   Tima Biswas is a 33 year old male who presents for evaluation of myalgias, referred from pulmonology.  He last saw Dr. Kasper in pulmonology in 3/19/19 for follow up of chronic cough.  Noted a history of fatigue and knee pain with concern for infectious arthritis.  Lyme titer was ordered in 2018 but not completed.  His  "cough is somewhat cyclical and has actually been better recently.  He has had some shortness of breath, usually associated with his cough but also with exertion like climbing up stairs.         He notes chronic joint stiffness and pain.  For more than 5 years, he has bilateral knee pain and morning stiffness.  He usually gets up around 6 am and works out around 8 am.  Not until after his workout does his knee stiffness really resolve.  He has not noticed any knee swelling, redness, or warmth.    Both hands also feel stiff and \"tight.\"  He has not noticed any specific individual joint swelling but his hands do generally swell at times, often after doing more work with his hands.  He denies any weakness or lack of hand strength.  In the morning sometimes his hands are numb and this may also occur with prolonged use of his hands.  He denies any foot pain or swelling.    He also has back pain, mostly in his lower neck and through his shoulder blades.  This is fairly steady throughout the day although does not stop him from activity.  He does not typically take any medication for his pain.    He is active physically.  He rides his bike to work, 5 miles each way, and then works out for 30 - 60 minutes.  After work he often goes to the gym to do strength training.  On weekends he does yard work and remodeling.      His stools have been looser recently.  He has 3 bowel movements per day, which is his usual pattern.  He has some stools which are partially formed and some that are watery.  He has some abdominal bloating at times but denies any abdominal pain.  He does not feel this is related to diet as he eats the same things every day.  His appetite is good.  In the past 6 months he has lost about 5 pounds, although he is actually trying to gain weight.      He does sometimes wake from sleep in the middle of the night due to joint pain.  His wife tells him he is quite restless at night but she has not mentioned he snores.  " He used to feel rested upon waking in the morning but now wakes still feeling tired.  He denies any fevers, chills, or sweats.     He had some heart palpitations starting in early college.  He did have some workup for this however nothing specific was found.  He also notes some mid-sternal chest pain, often occurring with intense activity and associated with shortness of breath.      He is originally from [place], [place] and did lyme disease research in the field approximately 10 years ago therefore was exposed to ticks on a regular basis.  He also goes hiking/camping at least 6 times a year in the [facility] area.  His father recently had Guillain Winters related to lyme disease.     He has started to have occasional headaches, which is new for him.  Mainly these are from the top of his head radiating backwards into his neck.  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  He also has occasional pain over both temples which he relates to stress.  His headaches are not significant enough to cause him to stop activity.    He denies any vision changes or dry eyes.  Occasionally when walking down the snyder, he feels slightly off balance and feels like the world is moving, although he denies actual sense of things spinning.      He denies any family history of psoriasis.  He did have a patch of dry skin on the inside of his elbow intermittently as a child through high school.  His brother had similar skin issue.  He denies any change in color of his hands when exposed to cold.     He saw Dr. Pickett in GI on 4/16/19 for a complaint of gastroesophageal reflux.  Prior visits for chronic cough were noted and normal chest x-ray imaging from December 2018 was reviewed.  GERD was diagnosed and empiric treatment with Omeprazole was started.  EGD was planned.  This was 5/6/19 impression was of reactive gastropathy; no H pylori and no metaplasia.  Since starting Omeprazole, his heartburn has  "improved.    Review of Systems:   Pertinent items are noted in HPI or as below, remainder of complete ROS is negative.      No recent problems with hearing or vision. No swallowing problems.   No wheezing  No indigestion, abdominal pain, nausea, vomiting  No urination problems, no bloody, cloudy urine, no dysuria  No tingling, weakness  No confusion  No rashes. No easy bleeding or bruising.     Active Medications:      omeprazole (PRILOSEC) 40 MG DR capsule, Take 1 capsule (40 mg) by mouth daily, Disp: 90 capsule, Rfl: 1     SERTRALINE HCL PO, Take 75 mg by mouth daily, Disp: , Rfl:      mometasone (ASMANEX) 220 MCG/INH inhaler, Inhale 1 puff into the lungs every evening (Patient not taking: Reported on 4/16/2019), Disp: 1 Inhaler, Rfl: 3     montelukast (SINGULAIR) 10 MG tablet, Take 1 tablet (10 mg) by mouth every evening (Patient not taking: Reported on 4/16/2019), Disp: 30 tablet, Rfl: 11     OMEPRAZOLE PO, Take by mouth daily, Disp: , Rfl:       Allergies:   No known drug allergies.      Past Medical History:  Gastroesophageal reflux disease   Obsessive-compulsive disorder   Depressive disorder      Past Surgical History:  Esophagogastroduodenoscopy 5/6/19   surgery    Family History:   Idiopathic lung disease - mother  Guillain Dickinson disease - father   Celiac disease - sister    His sister has celiac disease.  His brother and sister have thyroid issues, although he does not know the details.  His mother has had general \"inflammation\" although nothing that has been officially diagnosed.  No family history of early onset diabetes.  Social History:   Presents to clinic alone.  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   Alcohol Use: Rare alcohol use.   Drug Use: No illicit drug use.   PCP: [facility]      Physical Exam:   /74   Pulse 62   Temp 97.9  F (36.6  C) (Oral)   Resp 18   Ht 1.815 m (5' 11.46\")   Wt 84.5 kg (186 " lb 3.2 oz)   BMI 25.64 kg/m      Wt Readings from Last 4 Encounters:   06/22/19 84.5 kg (186 lb 3.2 oz)   05/06/19 86.7 kg (191 lb 2.2 oz)   04/16/19 87 kg (191 lb 14.4 oz)   03/19/19 86.2 kg (190 lb)     Constitutional: Well-developed, appearing stated age; cooperative  Eyes: Normal EOM, PERRLA, vision, conjunctiva, sclera  ENT: Normal external ears, nose, hearing, lips, teeth, gums, throat. No mucous membrane lesions, normal saliva pool  Neck: No mass or thyroid enlargement  Resp: Lungs clear to auscultation, nl to palpation  CV: RRR, no murmurs, rubs or gallops, no edema  GI: No ABD mass or tenderness, no HSM  : Not tested  Lymph: No cervical, supraclavicular, inguinal or epitrochlear nodes  MS: The TMJ, neck, shoulder, elbow, wrist, MCP/PIP/DIP, spine, hip, knee, ankle, and foot MTP/IP joints were examined and found normal. No active synovitis or altered joint anatomy. Full joint ROM. Normal  strength. No dactylitis, tenosynovitis, enthesopathy.  Knees are cool. No fluid wave. Scant effusion in the left knee.  Skin: No nail pitting, alopecia, rash, nodules or lesions. No evidence of psoriasis.  Neuro: Normal cranial nerves, strength, sensation, DTRs.   Psych: Normal judgement, orientation, memory, affect.     Scribe Disclosure:  I, Rosa Quiqueroro, am serving as a scribe to document services personally performed by Garth Osman MD at this visit, based upon the provider's statements to me. All documentation has been reviewed by the aforementioned provider prior to being entered into the official medical record.       Gatrh Osman MD

## 2019-06-24 LAB
ANA PAT SER IF-IMP: ABNORMAL
ANA SER QL IF: ABNORMAL
ANA TITR SER IF: ABNORMAL {TITER}
RHEUMATOID FACT SER NEPH-ACNC: <20 IU/ML (ref 0–20)

## 2019-06-26 LAB — CCP AB SER IA-ACNC: 1 U/ML

## 2019-06-27 ENCOUNTER — TELEPHONE (OUTPATIENT)
Dept: RHEUMATOLOGY | Facility: CLINIC | Age: 34
End: 2019-06-27

## 2019-06-27 DIAGNOSIS — M79.10 MYALGIA: ICD-10-CM

## 2019-06-27 DIAGNOSIS — G89.29 CHRONIC PAIN OF BOTH KNEES: Primary | ICD-10-CM

## 2019-06-27 DIAGNOSIS — M25.561 CHRONIC PAIN OF BOTH KNEES: Primary | ICD-10-CM

## 2019-06-27 DIAGNOSIS — M25.562 CHRONIC PAIN OF BOTH KNEES: Primary | ICD-10-CM

## 2019-06-27 NOTE — TELEPHONE ENCOUNTER
Left message for Elijah to return my call and will place the lab orders in the chart.  Sent the below message to Elijah in [facility] as well from Dr. Osman:    Blood counts are normal. Kidney function is normal. Inflammation is absent. Tests associated with severe rheumatoid arthritis are negative. Thyroid function is normal. There is slight elevation in KELLI--clinical significance is not immediately clear. I do recommend a few followup tests: anti-DNA, FAUSTINO panel, and urinalysis.    Rebecca Mraadiaga MSN, RN  Rheumatology RN Care Coordinator   [place]

## 2019-06-27 NOTE — TELEPHONE ENCOUNTER
Spoke to Elijah and gave him the following message from Dr. Osman:      Blood counts are normal. Kidney function is normal. Inflammation is absent. Tests associated with severe rheumatoid arthritis are negative. Thyroid function is normal. There is slight elevation in KELLI--clinical significance is not immediately clear. I do recommend a few followup tests: anti-DNA, FAUSTINO panel, and urinalysis.       Elijah said he will have those tests done tomorrow.      Rebecca Maradiaga MSN, RN  Rheumatology RN Care Coordinator   [place]

## 2019-06-28 DIAGNOSIS — M25.561 CHRONIC PAIN OF BOTH KNEES: ICD-10-CM

## 2019-06-28 DIAGNOSIS — M25.562 CHRONIC PAIN OF BOTH KNEES: ICD-10-CM

## 2019-06-28 DIAGNOSIS — G89.29 CHRONIC PAIN OF BOTH KNEES: ICD-10-CM

## 2019-06-28 DIAGNOSIS — M79.10 MYALGIA: ICD-10-CM

## 2019-06-28 LAB
ALBUMIN UR-MCNC: NEGATIVE MG/DL
APPEARANCE UR: CLEAR
BILIRUB UR QL STRIP: NEGATIVE
COLOR UR AUTO: YELLOW
GLUCOSE UR STRIP-MCNC: NEGATIVE MG/DL
HGB UR QL STRIP: NEGATIVE
KETONES UR STRIP-MCNC: NEGATIVE MG/DL
LEUKOCYTE ESTERASE UR QL STRIP: NEGATIVE
MUCOUS THREADS #/AREA URNS LPF: PRESENT /LPF
NITRATE UR QL: NEGATIVE
PH UR STRIP: 5 PH (ref 5–7)
RBC #/AREA URNS AUTO: 4 /HPF (ref 0–2)
SOURCE: ABNORMAL
SP GR UR STRIP: 1.02 (ref 1–1.03)
SQUAMOUS #/AREA URNS AUTO: <1 /HPF (ref 0–1)
UROBILINOGEN UR STRIP-MCNC: 0 MG/DL (ref 0–2)
WBC #/AREA URNS AUTO: 1 /HPF (ref 0–5)

## 2019-06-29 LAB
ENA RNP IGG SER IA-ACNC: <0.2 AI (ref 0–0.9)
ENA SCL70 IGG SER IA-ACNC: <0.2 AI (ref 0–0.9)
ENA SM IGG SER-ACNC: <0.2 AI (ref 0–0.9)
ENA SS-A IGG SER IA-ACNC: <0.2 AI (ref 0–0.9)
ENA SS-B IGG SER IA-ACNC: <0.2 AI (ref 0–0.9)

## 2019-07-01 LAB — DSDNA AB SER-ACNC: 1 IU/ML

## 2019-08-14 ENCOUNTER — TELEPHONE (OUTPATIENT)
Dept: GASTROENTEROLOGY | Facility: CLINIC | Age: 34
End: 2019-08-14

## 2019-08-20 ENCOUNTER — OFFICE VISIT (OUTPATIENT)
Dept: GASTROENTEROLOGY | Facility: CLINIC | Age: 34
End: 2019-08-20
Payer: COMMERCIAL

## 2019-08-20 VITALS
HEART RATE: 56 BPM | TEMPERATURE: 97.8 F | WEIGHT: 185.1 LBS | OXYGEN SATURATION: 98 % | HEIGHT: 72 IN | SYSTOLIC BLOOD PRESSURE: 121 MMHG | RESPIRATION RATE: 16 BRPM | BODY MASS INDEX: 25.07 KG/M2 | DIASTOLIC BLOOD PRESSURE: 72 MMHG

## 2019-08-20 DIAGNOSIS — M79.10 MYALGIA: ICD-10-CM

## 2019-08-20 DIAGNOSIS — K21.9 GASTROESOPHAGEAL REFLUX DISEASE, ESOPHAGITIS PRESENCE NOT SPECIFIED: ICD-10-CM

## 2019-08-20 DIAGNOSIS — M25.562 CHRONIC PAIN OF BOTH KNEES: ICD-10-CM

## 2019-08-20 DIAGNOSIS — M25.561 CHRONIC PAIN OF BOTH KNEES: ICD-10-CM

## 2019-08-20 DIAGNOSIS — G89.29 CHRONIC PAIN OF BOTH KNEES: ICD-10-CM

## 2019-08-20 RX ORDER — GABAPENTIN 300 MG/1
CAPSULE ORAL
Refills: 0 | COMMUNITY
Start: 2019-07-16 | End: 2021-05-12

## 2019-08-20 RX ORDER — SERTRALINE HYDROCHLORIDE 100 MG/1
100 TABLET, FILM COATED ORAL DAILY
Refills: 2 | COMMUNITY
Start: 2019-08-11 | End: 2021-05-12

## 2019-08-20 SDOH — HEALTH STABILITY: MENTAL HEALTH: HOW OFTEN DO YOU HAVE 6 OR MORE DRINKS ON ONE OCCASION?: MONTHLY

## 2019-08-20 SDOH — HEALTH STABILITY: MENTAL HEALTH: HOW MANY STANDARD DRINKS CONTAINING ALCOHOL DO YOU HAVE ON A TYPICAL DAY?: 1 OR 2

## 2019-08-20 SDOH — HEALTH STABILITY: MENTAL HEALTH: HOW OFTEN DO YOU HAVE A DRINK CONTAINING ALCOHOL?: 2-4 TIMES A MONTH

## 2019-08-20 ASSESSMENT — ENCOUNTER SYMPTOMS
FATIGUE: 1
BACK PAIN: 0
JOINT SWELLING: 1
SEIZURES: 0
NECK PAIN: 0
ARTHRALGIAS: 0
DIARRHEA: 1
BLOOD IN STOOL: 0
DEPRESSION: 1
DYSPNEA ON EXERTION: 1
PANIC: 1
MYALGIAS: 0
BLOATING: 0
VOMITING: 0
INCREASED ENERGY: 1
CONSTIPATION: 0
MUSCLE WEAKNESS: 0
LOSS OF CONSCIOUSNESS: 0
ABDOMINAL PAIN: 0
DECREASED CONCENTRATION: 1
INSOMNIA: 1
NAUSEA: 0
DIZZINESS: 0
MUSCLE CRAMPS: 0
MEMORY LOSS: 0
SHORTNESS OF BREATH: 1
HEARTBURN: 1
HEADACHES: 1
DISTURBANCES IN COORDINATION: 0
NERVOUS/ANXIOUS: 1
SPEECH CHANGE: 0
TINGLING: 1
STIFFNESS: 0

## 2019-08-20 ASSESSMENT — MIFFLIN-ST. JEOR: SCORE: 1822.12

## 2019-08-20 ASSESSMENT — PAIN SCALES - GENERAL: PAINLEVEL: NO PAIN (0)

## 2019-08-20 NOTE — PROGRESS NOTES
GI CLINIC VISIT - Follow-up    REASON FOR CONSULTATION: GERD    HPI: 33 year old male with PMH of OCD, depression, GERD presenting to GI clinic for follow-up GERD. Last seen 4/2019. Started on PPI. Underwent EGD 5/2019:  Impression:          - Z-line irregular, 39 cm from the incisors. Biopsied to                        rule out Rahman's. No erosive or ulcerative esophagitis.                        - Gastritis. Biopsied throughout stomach.                        - Normal duodenal bulb, first portion of the duodenum                        and second portion of the duodenum.     Gastric bx with reactive gastropathy  Esophageal GEJ bx with acute on chronic inflammation, no Rahman's.     He notes that his symptoms have significantly improved since starting on PPI. He now has symptoms at most 1-2x/week from daily prior. He does note that he now has 3-4 looser BMs daily from 2 BMs daily prior to starting PPI, and went from [place] 3 to 6 in consistency. No nocturnal BMs or incontinence.     He notes his cough has improved, but still has some occasional shortness of breath.     No N/V, dysphagia, odynophagia, black/bloody stool, weight changes.     PERTINENT MEDICATIONS:  Current Outpatient Medications   Medication Sig Dispense Refill     omeprazole (PRILOSEC) 40 MG DR capsule Take 1 capsule (40 mg) by mouth daily 90 capsule 1     sertraline (ZOLOFT) 100 MG tablet Take 100 mg by mouth daily  2     gabapentin (NEURONTIN) 300 MG capsule TAKE 1 CAPSULE BY MOUTH THREE TIMES DAILY AS NEEDED FOR ANXIETY  0     mometasone (ASMANEX) 220 MCG/INH inhaler Inhale 1 puff into the lungs every evening (Patient not taking: Reported on 8/20/2019) 1 Inhaler 3     montelukast (SINGULAIR) 10 MG tablet Take 1 tablet (10 mg) by mouth every evening (Patient not taking: Reported on 8/20/2019) 30 tablet 11     PHYSICAL EXAMINATION:  Vitals reviewed  /72 (BP Location: Left arm, Patient Position: Sitting, Cuff Size: Adult Regular)   Pulse  "56   Temp 97.8  F (36.6  C) (Oral)   Resp 16   Ht 1.82 m (5' 11.65\")   Wt 84 kg (185 lb 1.6 oz)   SpO2 98%   BMI 25.35 kg/m    Gen: aaox3, cooperative, pleasant, not diaphoretic, nad  HEENT: ncat, op w/o ulcer/exudate, anicteric, mmm  Neck: no lymphadenopathy  Resp: breathing comfortably on RA, CTAB  CV: RRR  Abd: soft, NTND. BS+  Ext: no c/c/e  Skin: warm, perfused, no jaundice  Neuro: grossly intact    PERTINENT STUDIES Reviewed in EMR    ASSESSMENT/PLAN: 33 year old male with PMH of OCD, depression, GERD and chronic cough for 3 years who was referred to GI clinic from pulmonary clinic for GERD, last seen 4/2019. Since then had EGD 5/2019 with irregular Z line but no erosive esophagitis and no Rahman's on biopsies. He is symptomatically improved on daily PPI, omeprazole 40 mg daily. He does have some mild loose stools compared to prior, likely secondary to omeprazole.      Plan:   - Trial reduction in PPI to 20 mg daily given increased loose stools   - Discussed lifestyle modifications for GERD again today    Continue follow-up with PCP, return to GI clinic PRN    Thank you for this consultation. It was a pleasure to participate in the care of this patient; please contact us with any further questions.    Discussed with Dr. Maximiliano Pickett MD  GI Fellow  p [phone]          "

## 2019-08-20 NOTE — NURSING NOTE
"Chief Complaint   Patient presents with     Gastrointestinal Problem     F/U for GERD; EGD 5/6/19       Vitals:    08/20/19 1453   BP: 121/72   BP Location: Left arm   Patient Position: Sitting   Cuff Size: Adult Regular   Pulse: 56   Resp: 16   Temp: 97.8  F (36.6  C)   TempSrc: Oral   SpO2: 98%   Weight: 84 kg (185 lb 1.6 oz)   Height: 1.82 m (5' 11.65\")       Body mass index is 25.35 kg/m .      Elena Bernard LPN                          "

## 2019-08-20 NOTE — LETTER
Date:August 22, 2019      Patient was self referred, no letter generated. Do not send.        [facility] Health Information       Detail Level: Zone

## 2019-08-20 NOTE — LETTER
8/20/2019       RE: Tima Biswas  [address] [facility] [address]     Dear Colleague,    Thank you for referring your patient, Tima Biswas, to the [facility] GASTROENTEROLOGY AND IBD CLINIC at [facility]. Please see a copy of my visit note below.    GI CLINIC VISIT - Follow-up    REASON FOR CONSULTATION: GERD    HPI: 33 year old male with PMH of OCD, depression, GERD presenting to GI clinic for follow-up GERD. Last seen 4/2019. Started on PPI. Underwent EGD 5/2019:  Impression:          - Z-line irregular, 39 cm from the incisors. Biopsied to                        rule out Rahman's. No erosive or ulcerative esophagitis.                        - Gastritis. Biopsied throughout stomach.                        - Normal duodenal bulb, first portion of the duodenum                        and second portion of the duodenum.     Gastric bx with reactive gastropathy  Esophageal GEJ bx with acute on chronic inflammation, no Rahman's.     He notes that his symptoms have significantly improved since starting on PPI. He now has symptoms at most 1-2x/week from daily prior. He does note that he now has 3-4 looser BMs daily from 2 BMs daily prior to starting PPI, and went from Burnet 3 to 6 in consistency. No nocturnal BMs or incontinence.     He notes his cough has improved, but still has some occasional shortness of breath.     No N/V, dysphagia, odynophagia, black/bloody stool, weight changes.     PERTINENT MEDICATIONS:  Current Outpatient Medications   Medication Sig Dispense Refill     omeprazole (PRILOSEC) 40 MG DR capsule Take 1 capsule (40 mg) by mouth daily 90 capsule 1     sertraline (ZOLOFT) 100 MG tablet Take 100 mg by mouth daily  2     gabapentin (NEURONTIN) 300 MG capsule TAKE 1 CAPSULE BY MOUTH THREE TIMES DAILY AS NEEDED FOR ANXIETY  0     mometasone (ASMANEX) 220 MCG/INH inhaler Inhale 1 puff into the lungs every evening (Patient not taking: Reported on  "8/20/2019) 1 Inhaler 3     montelukast (SINGULAIR) 10 MG tablet Take 1 tablet (10 mg) by mouth every evening (Patient not taking: Reported on 8/20/2019) 30 tablet 11     PHYSICAL EXAMINATION:  Vitals reviewed  /72 (BP Location: Left arm, Patient Position: Sitting, Cuff Size: Adult Regular)   Pulse 56   Temp 97.8  F (36.6  C) (Oral)   Resp 16   Ht 1.82 m (5' 11.65\")   Wt 84 kg (185 lb 1.6 oz)   SpO2 98%   BMI 25.35 kg/m     Gen: aaox3, cooperative, pleasant, not diaphoretic, nad  HEENT: ncat, op w/o ulcer/exudate, anicteric, mmm  Neck: no lymphadenopathy  Resp: breathing comfortably on RA, CTAB  CV: RRR  Abd: soft, NTND. BS+  Ext: no c/c/e  Skin: warm, perfused, no jaundice  Neuro: grossly intact    PERTINENT STUDIES Reviewed in EMR    ASSESSMENT/PLAN: 33 year old male with PMH of OCD, depression, GERD and chronic cough for 3 years who was referred to GI clinic from pulmonary clinic for GERD, last seen 4/2019. Since then had EGD 5/2019 with irregular Z line but no erosive esophagitis and no Rahman's on biopsies. He is symptomatically improved on daily PPI, omeprazole 40 mg daily. He does have some mild loose stools compared to prior, likely secondary to omeprazole.      Plan:   - Trial reduction in PPI to 20 mg daily given increased loose stools   - Discussed lifestyle modifications for GERD again today    Continue follow-up with PCP, return to GI clinic PRN    Thank you for this consultation. It was a pleasure to participate in the care of this patient; please contact us with any further questions.    Discussed with Dr. Maximiliano Pickett MD  GI Fellow  p [phone]            I performed a history and physical examination of the above patient and discussed the management with Dr. Pickett on 8/19/2019. I reviewed the note and there are no changes to the past medical, family or social history.  A complete 10 point review of systems was obtained. Please see the HPI for pertinent positives and " negatives. All other systems were reviewed and were found to be negative.     I agree with the documented findings and plan of care as outlined.    Stella Viera MD  GI Attending  Pager: [phone]    Again, thank you for allowing me to participate in the care of your patient.      Sincerely,    Dez Pickett MD

## 2019-08-20 NOTE — PATIENT INSTRUCTIONS
- Reduce omeprazole to 20 mg daily    It was a pleasure taking care of you today.  I've included a brief summary of our discussion and care plan from today's visit below.  Please review this information with your primary care provider.    Who do I call with any questions after my visit?  Please be in touch if there are any further questions that arise following today's visit.  There are multiple ways to contact your gastroenterology care team.        During business hours, you may reach a Gastroenterology nurse at [phone] and choose option 3.         To schedule or reschedule an appointment, please call [phone].       You can always send a secure message through Blackbay.  Blackbay messages are answered by your nurse or doctor typically within 24 hours.  Please allow extra time on weekends and holidays.        For urgent/emergent questions after business hours, you may reach the on-call GI Fellow by contacting the [facility]  at [phone].     How will I get the results of any tests ordered?    You will receive all of your results.  If you have signed up for Lytrot, any tests ordered at your visit will be available to you after your physician reviews them.  Typically this takes 1-2 weeks.  If there are urgent results that require a change in your care plan, your physician or nurse will call you to discuss the next steps.      What is Blackbay?  Blackbay is a secure way for you to access all of your healthcare records from the [facility].  It is a web based computer program, so you can sign on to it from any location.  It also allows you to send secure messages to your care team.  I recommend signing up for Blackbay access if you have not already done so and are comfortable with using a computer.      How to I schedule a follow-up visit?  If you did not schedule a follow-up visit today, please call [phone] to schedule a follow-up office visit.        Sincerely,    Dez  MD Piyush  Fellow  [facility]  Division of Gastroenterology

## 2019-08-21 LAB — B BURGDOR IGG+IGM SER QL: 0.71 (ref 0–0.89)

## 2019-08-21 NOTE — PROGRESS NOTES
I performed a history and physical examination of the above patient and discussed the management with Dr. Pickett on 8/19/2019. I reviewed the note and there are no changes to the past medical, family or social history.  A complete 10 point review of systems was obtained. Please see the HPI for pertinent positives and negatives. All other systems were reviewed and were found to be negative.     I agree with the documented findings and plan of care as outlined.    Stella Viera MD  GI Attending  Pager: [phone]

## 2019-09-18 ENCOUNTER — MEDICAL CORRESPONDENCE (OUTPATIENT)
Dept: HEALTH INFORMATION MANAGEMENT | Facility: CLINIC | Age: 34
End: 2019-09-18

## 2019-09-18 ENCOUNTER — TRANSFERRED RECORDS (OUTPATIENT)
Dept: HEALTH INFORMATION MANAGEMENT | Facility: CLINIC | Age: 34
End: 2019-09-18

## 2019-10-25 ENCOUNTER — TELEPHONE (OUTPATIENT)
Dept: SLEEP MEDICINE | Facility: CLINIC | Age: 34
End: 2019-10-25

## 2020-01-16 ENCOUNTER — OFFICE VISIT (OUTPATIENT)
Dept: SLEEP MEDICINE | Facility: CLINIC | Age: 35
End: 2020-01-16
Payer: COMMERCIAL

## 2020-01-16 VITALS
OXYGEN SATURATION: 100 % | BODY MASS INDEX: 26.14 KG/M2 | DIASTOLIC BLOOD PRESSURE: 70 MMHG | HEART RATE: 64 BPM | RESPIRATION RATE: 16 BRPM | HEIGHT: 71 IN | WEIGHT: 186.7 LBS | SYSTOLIC BLOOD PRESSURE: 114 MMHG

## 2020-01-16 DIAGNOSIS — G47.9 SLEEP DISORDER: ICD-10-CM

## 2020-01-16 DIAGNOSIS — G47.10 HYPERSOMNIA: Primary | ICD-10-CM

## 2020-01-16 PROBLEM — G25.9 ABNORMAL LEG MOVEMENT: Status: ACTIVE | Noted: 2020-01-16

## 2020-01-16 PROCEDURE — 99204 OFFICE O/P NEW MOD 45 MIN: CPT | Mod: GC | Performed by: INTERNAL MEDICINE

## 2020-01-16 ASSESSMENT — MIFFLIN-ST. JEOR: SCORE: 1814

## 2020-01-16 NOTE — PATIENT INSTRUCTIONS
1. Try taking your sertraline in the morning and then taper with your prescribing provider. (ideally off for two weeks prior to the PSG, you can restart after testing completed.)  2. We will consider referral to a sleep psychologist after we have results of your sleep study.   3. Work on getting as much sleep as you can.   4. Avoid driving and operating heavy machinery while sleepy.     Your BMI is Body mass index is 26.04 kg/m .  Weight management is a personal decision.  If you are interested in exploring weight loss strategies, the following discussion covers the approaches that may be successful. Body mass index (BMI) is one way to tell whether you are at a healthy weight, overweight, or obese. It measures your weight in relation to your height.  A BMI of 18.5 to 24.9 is in the healthy range. A person with a BMI of 25 to 29.9 is considered overweight, and someone with a BMI of 30 or greater is considered obese. More than two-thirds of American adults are considered overweight or obese.  Being overweight or obese increases the risk for further weight gain. Excess weight may lead to heart disease and diabetes.  Creating and following plans for healthy eating and physical activity may help you improve your health.  Weight control is part of healthy lifestyle and includes exercise, emotional health, and healthy eating habits. Careful eating habits lifelong are the mainstay of weight control. Though there are significant health benefits from weight loss, long-term weight loss with diet alone may be very difficult to achieve- studies show long-term success with dietary management in less than 10% of people. Attaining a healthy weight may be especially difficult to achieve in those with severe obesity. In some cases, medications, devices and surgical management might be considered.  What can you do?  If you are overweight or obese and are interested in methods for weight loss, you should discuss this with your  provider.     Consider reducing daily calorie intake by 500 calories.     Keep a food journal.     Avoiding skipping meals, consider cutting portions instead.    Diet combined with exercise helps maintain muscle while optimizing fat loss. Strength training is particularly important for building and maintaining muscle mass. Exercise helps reduce stress, increase energy, and improves fitness. Increasing exercise without diet control, however, may not burn enough calories to loose weight.       Start walking three days a week 10-20 minutes at a time    Work towards walking thirty minutes five days a week     Eventually, increase the speed of your walking for 1-2 minutes at time    In addition, we recommend that you review healthy lifestyles and methods for weight loss available through the National Institutes of Health patient information sites:  http://win.niddk.nih.gov/publications/index.htm    And look into health and wellness programs that may be available through your health insurance provider, employer, local community center, or [employer] club.    Weight management plan: Patient was referred to their PCP to discuss a diet and exercise plan.

## 2020-01-16 NOTE — LETTER
"    1/16/2020         RE: Tima Biswas  [address] [facility] [address]        Dear Colleague,    Thank you for referring your patient, Tmia Biswas, to the [facility] [place]. Please see a copy of my visit note below.    Chief complaint: Difficulty with sleep, daytime sleepiness    History of Present Illness:34 year old man with a history of anxiety, depression, GERD, and obsessive compulsive disorder here for sleepiness. In 2014 he went to a course in [place] for grad school where he worked for 20 hours a day and had significant sleep disruption. When he returned from the trip his wife had adopted another dog who was sleeping in the bed. He goes to bed at 10p and gets up at 530-545a every night.     He never wakes up rested no matter how much sleep he gets. He wakes up multiple times per night, he doesn't have much trouble falling back asleep. He doesn't know what wakes him up typically, though his wife is 20 weeks pregnant and now she occasionally is tossing and turning due to discomfort. He tries not to nap though falls asleep at the table, on the couch, or on the dog bed at times. His ideal sleep schedule is going to bed at 10 and getting up at 530. He reads in bed, occasionally uses a phone in bed but doesn't watch television or eat.     He has had an \"itchy\" feeling in the muscles in his legs in the past, he was started on gabapentin and those symptoms are much better. This does not keep him awake or wake him up at night.     He does grind his teeth. He does not snore, have witnessed apneas, or wake himself with snort arousals, gasping, or choking. He has had some heartburn in the past and this is improved with a PPI. He has no one in the family that has a diagnosis of sleep apnea but both of his older brothers sleep walk. He has had negative consequences at school, difficulty completing his work because he is so sleepy. He has had sleepiness while driving, and found himself " "having \"microsleeps\" a few times where he would wake up after a brief dream and it had only been a second. He has some occasional headaches in the posterior head that last up to a few hours, it's difficult to say how often they occur.     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 He is  and his wife is 20 weeks pregnant. They have 3 dogs and at times foster dogs as well. He has some stress due to work, finances, remodeling, family medical issues but nothing that keeps him awake at night or prevents him from going back to sleep.     Total score - Kirwin: 20 (1/16/2020  9:41 AM) (Less than 10 normal)    WILLIAM 17 (normal 0-7, mild 8-14, moderate 15-21, severe 22-28)    STOP-BANG  Loud Snore   no  Excessively Tired/Sleepy   yes  Observed apnea   no  Hypertension   no  BMI> 35 kg/m2   no  Age >50   no  Neck >16 in/40cm   no  Male Gender   yes  Total =   2  (0-2 low, 3-4 intermediate, 5-8 high risk of DONOVAN)      Past Medical History:   Diagnosis Date     Depressive disorder      Gastroesophageal reflux disease      OCD (obsessive compulsive disorder)        Allergies   Allergen Reactions     Cats        Current Outpatient Medications   Medication     gabapentin (NEURONTIN) 300 MG capsule     omeprazole (PRILOSEC) 20 MG DR capsule     sertraline (ZOLOFT) 100 MG tablet     No current facility-administered medications for this visit.        Social History     Socioeconomic History     Marital status:      Spouse name: Not on file     Number of children: Not on file     Years of education: Not on file     Highest education level: Not on file   Occupational History     Not on file   Social Needs     Financial resource strain: Not on file     Food insecurity:     Worry: Not on file     Inability: Not on file     Transportation needs:     Medical: Not on file     Non-medical: Not on file   Tobacco Use     Smoking status: Never Smoker     Smokeless tobacco: Never Used   Substance and Sexual " "Activity     Alcohol use: Yes     Alcohol/week: 3.0 - 6.0 standard drinks     Types: 1 - 2 Glasses of wine, 1 - 2 Cans of beer, 1 - 2 Shots of liquor per week     Frequency: 2-4 times a month     Drinks per session: 1 or 2     Binge frequency: Monthly     Comment: rare     Drug use: No     Sexual activity: Not on file   Lifestyle     Physical activity:     Days per week: Not on file     Minutes per session: Not on file     Stress: Not on file   Relationships     Social connections:     Talks on phone: Not on file     Gets together: Not on file     Attends Jainism service: Not on file     Active member of club or organization: Not on file     Attends meetings of clubs or organizations: Not on file     Relationship status: Not on file     Intimate partner violence:     Fear of current or ex partner: Not on file     Emotionally abused: Not on file     Physically abused: Not on file     Forced sexual activity: Not on file   Other Topics Concern     Not on file   Social History Narrative     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       Family History   Problem Relation Age of Onset     LUNG DISEASE Mother         idiopathic lung disease treated with steroids     Neuromuscular Disease Father         Gu[place]an [place] associated with chronic Lyme     Sleep walking Brother      Sleep walking Brother      Lung Cancer No family hx of        Review of Systems: Positve for seldom double vision, headaches, numbness, joint/bone pain, depression/anxiety and per HPI, otherwise comprehensive review of systems is negative.    EXAM:  /70   Pulse 64   Resp 16   Ht 1.803 m (5' 11\")   Wt 84.7 kg (186 lb 11.2 oz)   SpO2 100%   BMI 26.04 kg/m     HEENT: Normocephalic/atraumatic, pupils are equal, reactive to light, no scleral icterus  Oropharynx: Mallampati 1, tonsillar stage 1, normal posterior pharynx  Neck: Supple, no lymphadenopathy, neck circumference 14 inches  Chest: Clear to auscultation bilaterally, no rales or wheezes  Cardiac: " Regular rate and rhythm, S1-S2 normal  Abdomen: Obese, soft and nontender, bowel sounds present  Extremities: Warm, well perfused, no cyanosis clubbing or edema  Psychiatric: Mood and affect appear normal  Neurologic: No gross focal deficits    ASSESSMENT:  34 year old man with a history of depression, anxiety, obsessive compulsive disorder, and GERD here for evaluation of sleepiness. His ESS is 20 and he has frequent nighttime arousals despite good sleep hygiene and spending 7 hours 45 minutes in bed. He has todd symptoms that are suggestive of insomnia - he identifies a fairly clear trigger with sleep disruption in 2014, however, he is far sleepier than expected if his primary disorder is insomnia. He is low risk for DONOVAN with a STOPBANG of 2 and needs further workup for disorders of arousal and periodic limb movements. Other possibilities include idiopathic hypersomnia and narcolepsy/cataplexy (though he exhibits no symptoms).     PLAN:  Actigraphy x2 weeks, in lab PSG, and MSLT - consider tapering sertraline prior to testing   He will discuss with his mental health provider tapering his sertraline prior to testing  He will try and allow himself to sleep later   Advised to avoid driving and operating heavy machinery while tired   Consider sleep psychology referral pending the above     Patient was seen and discussed with Dr. Vines.    Junior Canas MD  Pulmonary/Critical Care Fellow     Attending: Patient seen and examined and personally counseled. Chart reviewed. This note reflects our mutual assessment and plan.  Autumn Vines MD   Medicine  Sleep Medicine    [facility]   Floor [address], [address]   [address] [place], [place] [address] [address]   Appointments: [phone]    The above note was dictated using voice recognition software and may include typographical errors. Please contact the author for any  clarifications.                Again, thank you for allowing me to participate in the care of your patient.        Sincerely,        Autumn Vines MD

## 2020-01-16 NOTE — PROGRESS NOTES
"Chief complaint: Difficulty with sleep, daytime sleepiness    History of Present Illness:34 year old man with a history of anxiety, depression, GERD, and obsessive compulsive disorder here for sleepiness. In 2014 he went to a course in [place] for grad school where he worked for 20 hours a day and had significant sleep disruption. When he returned from the trip his wife had adopted another dog who was sleeping in the bed. He goes to bed at 10p and gets up at 530-545a every night.     He never wakes up rested no matter how much sleep he gets. He wakes up multiple times per night, he doesn't have much trouble falling back asleep. He doesn't know what wakes him up typically, though his wife is 20 weeks pregnant and now she occasionally is tossing and turning due to discomfort. He tries not to nap though falls asleep at the table, on the couch, or on the dog bed at times. His ideal sleep schedule is going to bed at 10 and getting up at 530. He reads in bed, occasionally uses a phone in bed but doesn't watch television or eat.     He has had an \"itchy\" feeling in the muscles in his legs in the past, he was started on gabapentin and those symptoms are much better. This does not keep him awake or wake him up at night.     He does grind his teeth. He does not snore, have witnessed apneas, or wake himself with snort arousals, gasping, or choking. He has had some heartburn in the past and this is improved with a PPI. He has no one in the family that has a diagnosis of sleep apnea but both of his older brothers sleep walk. He has had negative consequences at school, difficulty completing his work because he is so sleepy. He has had sleepiness while driving, and found himself having \"microsleeps\" a few times where he would wake up after a brief dream and it had only been a second. He has some occasional headaches in the posterior head that last up to a few hours, it's difficult to say how often they occur.     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 He is  and his wife is 20 weeks pregnant. They have 3 dogs and at times foster dogs as well. He has some stress due to work, finances, remodeling, family medical issues but nothing that keeps him awake at night or prevents him from going back to sleep.     Total score - [place]: 20 (1/16/2020  9:41 AM) (Less than 10 normal)    WILLIAM 17 (normal 0-7, mild 8-14, moderate 15-21, severe 22-28)    STOP-BANG  Loud Snore   no  Excessively Tired/Sleepy   yes  Observed apnea   no  Hypertension   no  BMI> 35 kg/m2   no  Age >50   no  Neck >16 in/40cm   no  Male Gender   yes  Total =   2  (0-2 low, 3-4 intermediate, 5-8 high risk of DONOVAN)      Past Medical History:   Diagnosis Date     Depressive disorder      Gastroesophageal reflux disease      OCD (obsessive compulsive disorder)        Allergies   Allergen Reactions     Cats        Current Outpatient Medications   Medication     gabapentin (NEURONTIN) 300 MG capsule     omeprazole (PRILOSEC) 20 MG DR capsule     sertraline (ZOLOFT) 100 MG tablet     No current facility-administered medications for this visit.        Social History     Socioeconomic History     Marital status:      Spouse name: Not on file     Number of children: Not on file     Years of education: Not on file     Highest education level: Not on file   Occupational History     Not on file   Social Needs     Financial resource strain: Not on file     Food insecurity:     Worry: Not on file     Inability: Not on file     Transportation needs:     Medical: Not on file     Non-medical: Not on file   Tobacco Use     Smoking status: Never Smoker     Smokeless tobacco: Never Used   Substance and Sexual Activity     Alcohol use: Yes     Alcohol/week: 3.0 - 6.0 standard drinks     Types: 1 - 2 Glasses of wine, 1 - 2 Cans of beer, 1 - 2 Shots of liquor per week     Frequency: 2-4 times a month     Drinks per session: 1 or 2     Binge frequency: Monthly  "    Comment: rare     Drug use: No     Sexual activity: Not on file   Lifestyle     Physical activity:     Days per week: Not on file     Minutes per session: Not on file     Stress: Not on file   Relationships     Social connections:     Talks on phone: Not on file     Gets together: Not on file     Attends Caodaism service: Not on file     Active member of club or organization: Not on file     Attends meetings of clubs or organizations: Not on file     Relationship status: Not on file     Intimate partner violence:     Fear of current or ex partner: Not on file     Emotionally abused: Not on file     Physically abused: Not on file     Forced sexual activity: Not on file   Other Topics Concern     Not on file   Social History Narrative     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       Family History   Problem Relation Age of Onset     LUNG DISEASE Mother         idiopathic lung disease treated with steroids     Neuromuscular Disease Father         [place] associated with chronic Lyme     Sleep walking Brother      Sleep walking Brother      Lung Cancer No family hx of        Review of Systems: Positve for seldom double vision, headaches, numbness, joint/bone pain, depression/anxiety and per HPI, otherwise comprehensive review of systems is negative.    EXAM:  /70   Pulse 64   Resp 16   Ht 1.803 m (5' 11\")   Wt 84.7 kg (186 lb 11.2 oz)   SpO2 100%   BMI 26.04 kg/m    HEENT: Normocephalic/atraumatic, pupils are equal, reactive to light, no scleral icterus  Oropharynx: Mallampati 1, tonsillar stage 1, normal posterior pharynx  Neck: Supple, no lymphadenopathy, neck circumference 14 inches  Chest: Clear to auscultation bilaterally, no rales or wheezes  Cardiac: Regular rate and rhythm, S1-S2 normal  Abdomen: Obese, soft and nontender, bowel sounds present  Extremities: Warm, well perfused, no cyanosis clubbing or edema  Psychiatric: Mood and affect appear normal  Neurologic: No gross focal " deficits    ASSESSMENT:  34 year old man with a history of depression, anxiety, obsessive compulsive disorder, and GERD here for evaluation of sleepiness. His ESS is 20 and he has frequent nighttime arousals despite good sleep hygiene and spending 7 hours 45 minutes in bed. He has todd symptoms that are suggestive of insomnia - he identifies a fairly clear trigger with sleep disruption in 2014, however, he is far sleepier than expected if his primary disorder is insomnia. He is low risk for DONOVAN with a STOPBANG of 2 and needs further workup for disorders of arousal and periodic limb movements. Other possibilities include idiopathic hypersomnia and narcolepsy/cataplexy (though he exhibits no symptoms).     PLAN:  Actigraphy x2 weeks, in lab PSG, and MSLT - consider tapering sertraline prior to testing   He will discuss with his mental health provider tapering his sertraline prior to testing  He will try and allow himself to sleep later   Advised to avoid driving and operating heavy machinery while tired   Consider sleep psychology referral pending the above     Patient was seen and discussed with Dr. Vines.    Junior Canas MD  Pulmonary/Critical Care Fellow     Attending: Patient seen and examined and personally counseled. Chart reviewed. This note reflects our mutual assessment and plan.  Autumn Vines MD   Medicine  Sleep Medicine    [facility]   Floor 1, [address]   [address] [place] [address]. [place] [address]   Appointments: [phone]    The above note was dictated using voice recognition software and may include typographical errors. Please contact the author for any clarifications.

## 2020-02-10 ENCOUNTER — OFFICE VISIT (OUTPATIENT)
Dept: SLEEP MEDICINE | Facility: CLINIC | Age: 35
End: 2020-02-10
Payer: COMMERCIAL

## 2020-02-10 DIAGNOSIS — G47.19 EXCESSIVE DAYTIME SLEEPINESS: Primary | ICD-10-CM

## 2020-02-10 PROCEDURE — 95803 ACTIGRAPHY TESTING: CPT | Performed by: INTERNAL MEDICINE

## 2020-02-10 NOTE — PROGRESS NOTES
Patient presented to clinic for  and demonstration of the Actigraphy and sleep logs. Patient was set up and instructed use. Patient verbalized understanding and demonstrated set up back to me. Patient will be returning device by 2/24/2020. Pt stated that he had a tough time reducing Sertraline and went back to taking the full dose last week.   Patient was given sleep logs and written instructions for use.

## 2020-02-17 ENCOUNTER — ANCILLARY PROCEDURE (OUTPATIENT)
Dept: ULTRASOUND IMAGING | Facility: CLINIC | Age: 35
End: 2020-02-17
Attending: FAMILY MEDICINE
Payer: COMMERCIAL

## 2020-02-17 DIAGNOSIS — N50.89 TESTICULAR LUMP: ICD-10-CM

## 2020-02-24 ENCOUNTER — THERAPY VISIT (OUTPATIENT)
Dept: SLEEP MEDICINE | Facility: CLINIC | Age: 35
End: 2020-02-24
Payer: COMMERCIAL

## 2020-02-24 DIAGNOSIS — G47.10 HYPERSOMNIA: ICD-10-CM

## 2020-02-24 PROCEDURE — 95810 POLYSOM 6/> YRS 4/> PARAM: CPT | Performed by: INTERNAL MEDICINE

## 2020-02-25 ENCOUNTER — THERAPY VISIT (OUTPATIENT)
Dept: SLEEP MEDICINE | Facility: CLINIC | Age: 35
End: 2020-02-25
Payer: COMMERCIAL

## 2020-02-25 ENCOUNTER — DOCUMENTATION ONLY (OUTPATIENT)
Dept: SLEEP MEDICINE | Facility: CLINIC | Age: 35
End: 2020-02-25
Payer: COMMERCIAL

## 2020-02-25 DIAGNOSIS — G89.29 CHRONIC PAIN OF BOTH KNEES: ICD-10-CM

## 2020-02-25 DIAGNOSIS — M25.562 CHRONIC PAIN OF BOTH KNEES: ICD-10-CM

## 2020-02-25 DIAGNOSIS — M79.10 MYALGIA: ICD-10-CM

## 2020-02-25 DIAGNOSIS — M25.561 CHRONIC PAIN OF BOTH KNEES: ICD-10-CM

## 2020-02-25 LAB
ALBUMIN UR-MCNC: NEGATIVE MG/DL
APPEARANCE UR: CLEAR
BILIRUB UR QL STRIP: NEGATIVE
COLOR UR AUTO: YELLOW
GLUCOSE UR STRIP-MCNC: NEGATIVE MG/DL
HGB UR QL STRIP: NEGATIVE
KETONES UR STRIP-MCNC: NEGATIVE MG/DL
LEUKOCYTE ESTERASE UR QL STRIP: NEGATIVE
NITRATE UR QL: NEGATIVE
PH UR STRIP: 5.5 PH (ref 5–7)
RBC #/AREA URNS AUTO: 1 /HPF (ref 0–2)
SOURCE: NORMAL
SP GR UR STRIP: 1.01 (ref 1–1.03)
UROBILINOGEN UR STRIP-MCNC: NORMAL MG/DL (ref 0–2)
WBC #/AREA URNS AUTO: 0 /HPF (ref 0–5)

## 2020-02-25 PROCEDURE — 81001 URINALYSIS AUTO W/SCOPE: CPT | Performed by: INTERNAL MEDICINE

## 2020-02-25 PROCEDURE — 95805 MULTIPLE SLEEP LATENCY TEST: CPT | Performed by: INTERNAL MEDICINE

## 2020-02-25 NOTE — PROGRESS NOTES
Patient returned Acti device. It was downloaded and forwarded to the provider for review.    Dewayne Mar  Sleep Clinic Specialist - [place]

## 2020-02-25 NOTE — PATIENT INSTRUCTIONS
[place] [facility]    1. Your sleep study will be reviewed by a sleep physician within the next few days.     2. Please follow up in the sleep clinic as scheduled, or, make an appointment with your sleep provider to be seen within two weeks to discuss the results of the sleep study.    3. If you have any questions or problems with your treatment plan, please contact your sleep clinic provider at [phone] to further manage your condition.    4. Please review your attached medication list, and, at your follow-up appointment advise your sleep clinic provider about any changes.    5. Go to [email or web address] for more information about your sleep problems.    Dash Schaffer, RPSGT  February 25, 2020

## 2020-02-25 NOTE — Clinical Note
Acti report put in providers folder for review.[facility] [place]

## 2020-02-26 NOTE — PROCEDURES
" SLEEP STUDY INTERPRETATION  DIAGNOSTIC POLYSOMNOGRAPHY REPORT      Patient: MELLO SAMANIEGO  YOB: 1985  Study Date: 2/24/2020  MRN: [id number]  Referring Provider: Self  Ordering Provider: Autumn Vines MD    Indications for Polysomnography: The patient is a 34 y old Male who is 5' 11\" and weighs 186.0 lbs. His BMI is 26.0, [place] sleepiness scale 20.0 and neck circumference is 36.0 cm. Relevant medical history includes anxiety, depression. A diagnostic polysomnogram was performed to evaluate for sleep apnea/PLMS/RLS/RBD/hypersomnia/narcolepsy.    Polysomnogram Data: A full night polysomnogram recorded the standard physiologic parameters including EEG, EOG, EMG, ECG, nasal and oral airflow. Respiratory parameters of chest and abdominal movements were recorded with respiratory inductance plethysmography. Oxygen saturation was recorded by pulse oximetry. Hypopnea scoring rule used: 1B 4%.    Sleep Architecture: Decreased sleep efficiency with increased arousal index and increased wake after sleep onset.  The total recording time of the polysomnogram was 479.3 minutes. The total sleep time was 371.0 minutes. Sleep latency was normal at 16.4 minutes without the use of a sleep aid. REM latency was 134.5 minutes. Arousal index was increased at 28.1 arousals per hour. Sleep efficiency was decreased at 77.4%. Wake after sleep onset was 89.5 minutes. The patient spent 12.3% of total sleep time in Stage N1, 62.0% in Stage N2, 11.1% in Stage N3, and 14.7% in REM. Time in REM supine was - minutes.    Respiration: Moderate snoring without obstructive sleep apnea.    Events ? The polysomnogram revealed a presence of - obstructive, 1 central, and - mixed apneas resulting in an apnea index of 0.2 events per hour. There were 2 obstructive hypopneas and - central hypopneas resulting in an obstructive hypopnea index of 0.3 and central hypopnea index of - events per hour. The combined apnea/hypopnea index was 0.5 " events per hour (central apnea/hypopnea index was 0.2 events per hour). The REM AHI was - events per hour. The supine AHI was 1.0 event per hour. The RERA index was 6.0 events per hour.  The RDI was 6.5 events per hour.    Snoring - was reported as moderate and intermittent.    Respiratory rate and pattern - was notable for normal respiratory rate and pattern.    Sustained Sleep Associated Hypoventilation - Transcutaneous carbon dioxide monitoring was not used, however significant hypoventilation was not suggested by oximetry.    Sleep Associated Hypoxemia - (Greater than 5 minutes O2 sat at or below 88%) was not present. Baseline oxygen saturation was 95.8%. Lowest oxygen saturation was 90.2%. Time spent less than or equal to 88% was 0 minutes. Time spent less than or equal to 89% was 0 minutes.    Movement Activity: Frequent periodic limb movements move most which were not associated with arousals.    Periodic Limb Activity - There were 68 PLMs during the entire study. The PLM index was 11.0 movements per hour. The PLM Arousal Index was 3.1 per hour.    REM EMG Activity - Excessive transient/sustained muscle activity was not present.    Nocturnal Behavior - Abnormal sleep related behaviors were not noted.    Bruxism - None apparent.    Cardiac Summary: Normal sinus rhythm and rates.  The average pulse rate was 54.6 bpm. The minimum pulse rate was 41.8 bpm while the maximum pulse rate was 103.3 bpm.  Arrhythmias were not noted.    Pre PSG Evaluation:     Sleep Log - Consistent with actigraphy.    Actigraphy -   o Dates of recording - From 2/10/2020  to 2/24/2020.  o Quality - Good.  o Sleep diary - Correlates well.  o Bed Time - Average at 11.  o Get Up Time - Average at 6.  o Total Sleep Time - Average at 8.5 hours.  - Minimum 6 hours.  - Maximum 10 hours.  o Sleep Onset Delay - No delays were observed  o Sleep Periods - Consolidated  o Light exposure periods - Appropriately timed to sleep schedule  o Napping -  "Not noted.  Actigraphy Interpretation: Sleep wake pattern is consistent with:  o Regular sleep pattern with sufficient sleep    Assessment:     Moderate snoring without obstructive sleep apnea.    Decreased sleep efficiency with increased arousal index and increased wake after sleep onset.    Frequent periodic limb movements move most which were not associated with arousals.    Normal sinus rhythm and rates.    Regular sleep pattern with sufficient sleep-caffeine noted daily (three+)      Recommendations:    Patient to stay for mean sleep latency testing per orders    Advice regarding the risks of drowsy driving.    Suggest optimizing sleep schedule and avoiding sleep deprivation.    Consider decreasing caffeine consumption (limit to one in AM)    Pharmacologic therapy should be used for management of restless legs syndrome only if present and clinically indicated and not based on the presence of periodic limb movements alone.    Diagnostic Codes:   Unspecified Sleep Disturbance G47.9  Repetitive Intrusions Into Sleep F51.8       _____________________________________   Electronically Signed By: Autumn Vines MD 2/26/2020              MSLT REPORT          Patient Name: MELLO SAMANIEGO Study Date: 2/25/2020   YOB: 1985 Study Type: MSLT   Age:  34 y MRN: [id number]   Sex: Male Interp Physician: Autumn Vines MD   BMI:  26.0 Ordering Physician: Autumn Vines MD   Height: 5' 11\" Referring Physician: Elizabeth Barthel, MD   Weight: 186.0 lbs Recording Tech: ISAEL Hill   Pioneer: 20.0 Neck Size (cm): 36.0 Scoring Tech: ISAEL Hill   Nap Summary       Nap 1 Nap 2 Nap 3 Nap 4 Nap 5 Average   Lights Off 08:36:48 AM 10:31:02 AM 12:43:02 PM 02:45:35 PM 04:36:58 PM -   Lights On 08:56:31 AM 10:58:29 AM 01:11:09 PM 03:12:33 PM 05:03:36 PM -   Time In Bed 20 28 28 27 27 26   Sleep Time - 10 16 15 5 9   Sleep Efficiency 0.0% 36.8% 56.1% 54.4% 16.9% 34.9%   Sleep Onset - 10:43:53 AM 12:54:53 PM " 02:57:23 PM 04:48:53 PM -   Sleep Latency 20 13 12 12 12 14   REM Onset - - - - - -   REM Sleep Onset Latency - - - - - -         Recording / Sleep Tech Comments    10 Channel MSLT consisting of 5 naps was performed. A UTOX sample along with Actigraphy and sleep logs were obtained. Breakfast was consumed after the first nap. Lunch was consumed at 11:30 before nap 3. When not napping, pt was out his bed and room working on his laptop. Prior to starting 4th nap, pt stated that he had a slight headache that may have been caused from no caffeine intake.     Nap 1) Pt felt normal(alert), SO: 20 min, REM latency: 20 min, pt felt like he did not sleep, pt felt alert after the nap.    Nap 2) Pt felt alert, SO: 13 min, REM latency: 20 min, pt felt that was in and out of sleep, pt felt alert after nap.    Nap 3) Pt felt alert, SO: 12 min, REM latency: 20 min, pt felt that he did sleep but not deep, pt felt a little tired after nap.    Nap 4) Pt a little tired, SO: 12 min, REM latency: 20 min, pt felt that he slept a little. Pt felt alert after tired after nap.    Nap 5) Pt felt tired, SO: 12 min, REM latency: 20 min, pt felt that he slept a little. Pt felt a little tired after nap.     NORIS Hill 2/26/2020    Physician Interpretation    Attending:  Mean sleep latency testing of 14 minutes is within normal limits and does not support diagnosis of CNS hypersomnia. See PSG interpretation for recommendations.    _____________________________________   Electronically Signed By: Autumn Vines MD 2/26/2020

## 2020-02-28 LAB — SLPCOMP: NORMAL

## 2020-03-02 ENCOUNTER — HEALTH MAINTENANCE LETTER (OUTPATIENT)
Age: 35
End: 2020-03-02

## 2020-12-20 ENCOUNTER — HEALTH MAINTENANCE LETTER (OUTPATIENT)
Age: 35
End: 2020-12-20

## 2021-04-18 ENCOUNTER — HEALTH MAINTENANCE LETTER (OUTPATIENT)
Age: 36
End: 2021-04-18

## 2021-05-12 ENCOUNTER — MYC MEDICAL ADVICE (OUTPATIENT)
Dept: FAMILY MEDICINE | Facility: CLINIC | Age: 36
End: 2021-05-12

## 2021-05-12 ENCOUNTER — NURSE TRIAGE (OUTPATIENT)
Dept: NURSING | Facility: CLINIC | Age: 36
End: 2021-05-12

## 2021-05-12 ENCOUNTER — OFFICE VISIT (OUTPATIENT)
Dept: FAMILY MEDICINE | Facility: CLINIC | Age: 36
End: 2021-05-12
Payer: COMMERCIAL

## 2021-05-12 VITALS
RESPIRATION RATE: 16 BRPM | DIASTOLIC BLOOD PRESSURE: 72 MMHG | HEART RATE: 68 BPM | SYSTOLIC BLOOD PRESSURE: 123 MMHG | OXYGEN SATURATION: 100 % | TEMPERATURE: 97.7 F

## 2021-05-12 DIAGNOSIS — J02.9 SORE THROAT: Primary | ICD-10-CM

## 2021-05-12 DIAGNOSIS — B37.0 THRUSH: Primary | ICD-10-CM

## 2021-05-12 DIAGNOSIS — F32.1 CURRENT MODERATE EPISODE OF MAJOR DEPRESSIVE DISORDER, UNSPECIFIED WHETHER RECURRENT (H): ICD-10-CM

## 2021-05-12 DIAGNOSIS — F41.1 GAD (GENERALIZED ANXIETY DISORDER): ICD-10-CM

## 2021-05-12 LAB
DEPRECATED S PYO AG THROAT QL EIA: NEGATIVE
SARS-COV-2 RNA RESP QL NAA+PROBE: NORMAL
SPECIMEN SOURCE: NORMAL
STREP GROUP A PCR: NOT DETECTED

## 2021-05-12 PROCEDURE — 99N1174 PR STATISTIC STREP A RAPID: Performed by: NURSE PRACTITIONER

## 2021-05-12 PROCEDURE — 99204 OFFICE O/P NEW MOD 45 MIN: CPT | Performed by: NURSE PRACTITIONER

## 2021-05-12 PROCEDURE — U0005 INFEC AGEN DETEC AMPLI PROBE: HCPCS | Performed by: NURSE PRACTITIONER

## 2021-05-12 PROCEDURE — 87651 STREP A DNA AMP PROBE: CPT | Performed by: NURSE PRACTITIONER

## 2021-05-12 PROCEDURE — U0003 INFECTIOUS AGENT DETECTION BY NUCLEIC ACID (DNA OR RNA); SEVERE ACUTE RESPIRATORY SYNDROME CORONAVIRUS 2 (SARS-COV-2) (CORONAVIRUS DISEASE [COVID-19]), AMPLIFIED PROBE TECHNIQUE, MAKING USE OF HIGH THROUGHPUT TECHNOLOGIES AS DESCRIBED BY CMS-2020-01-R: HCPCS | Performed by: NURSE PRACTITIONER

## 2021-05-12 RX ORDER — BUPROPION HYDROCHLORIDE 150 MG/1
150 TABLET ORAL EVERY MORNING
Qty: 90 TABLET | Refills: 1 | Status: SHIPPED | OUTPATIENT
Start: 2021-05-12 | End: 2021-10-19

## 2021-05-12 RX ORDER — SERTRALINE HYDROCHLORIDE 100 MG/1
100 TABLET, FILM COATED ORAL DAILY
Qty: 90 TABLET | Refills: 1 | Status: SHIPPED | OUTPATIENT
Start: 2021-05-12

## 2021-05-12 RX ORDER — NYSTATIN 100000/ML
SUSPENSION, ORAL (FINAL DOSE FORM) ORAL
Qty: 280 ML | Refills: 0 | Status: SHIPPED | OUTPATIENT
Start: 2021-05-12 | End: 2024-10-02

## 2021-05-12 RX ORDER — GABAPENTIN 300 MG/1
300 CAPSULE ORAL 3 TIMES DAILY PRN
Qty: 180 CAPSULE | Refills: 0 | Status: SHIPPED | OUTPATIENT
Start: 2021-05-12

## 2021-05-12 ASSESSMENT — PATIENT HEALTH QUESTIONNAIRE - PHQ9: SUM OF ALL RESPONSES TO PHQ QUESTIONS 1-9: 22

## 2021-05-12 ASSESSMENT — PAIN SCALES - GENERAL: PAINLEVEL: NO PAIN (0)

## 2021-05-12 NOTE — PROGRESS NOTES
Assessment & Plan     (J02.9) Sore throat  (primary encounter diagnosis)  Comment: Counseled on self-care measures including: salt-water gurgles, cough drops, OTC acetaminophen PRN, hydration, rest, self quantine, masking, handwashing, and red flags of when to return including difficulty breathing.  Plan: Streptococcus A Rapid Scr w Reflx to PCR, Group        A Streptococcus PCR Throat Swab, Symptomatic         COVID-19 Virus (Coronavirus) by PCR          (F32.1) Current moderate episode of major depressive disorder, unspecified whether recurrent (H)  Comment: Long history of depression and anxiety. Not controlled at this time and worsening over the past several months.   Plan:   - Continue sertraline (ZOLOFT) 100 MG tablet  - Counseled on risks and benefits of additional medications. Start buPROPion (WELLBUTRIN XL) 150 MG 24 hr tablet  - Patient agrees to set up appointment with his therapist.          (F41.1) ADEEL (generalized anxiety disorder)  Comment: Long history of depression and anxiety. Not controlled at this time.   Plan:   - Continue sertraline (ZOLOFT) 100 MG tablet  - Continue gabapentin (NEURONTIN) 300 MG capsule  - Patient agree to set up appointment with his therapist          Review of the result(s) of each unique test - as noted   Ordering of each unique test  Prescription drug management  35 minutes spent on the date of the encounter doing chart review, history and exam, documentation and further activities per the note     Depression Screening Follow Up    PHQ 5/12/2021   PHQ-9 Total Score 22   Q9: Thoughts of better off dead/self-harm past 2 weeks Nearly every day   F/U: Thoughts of suicide or self-harm -   F/U: Safety concerns -     Last PHQ-9 5/12/2021   1.  Little interest or pleasure in doing things 2   2.  Feeling down, depressed, or hopeless 2   3.  Trouble falling or staying asleep, or sleeping too much 3   4.  Feeling tired or having little energy 3   5.  Poor appetite or overeating 2    6.  Feeling bad about yourself 3   7.  Trouble concentrating 2   8.  Moving slowly or restless 2   Q9: Thoughts of better off dead/self-harm past 2 weeks 3   PHQ-9 Total Score 22   Difficulty at work, home, or with people Very difficult   In the past two weeks have you had thoughts of suicide or self harm? -   Do you have concerns about your personal safety or the safety of others? -     Follow Up  Follow Up Actions Taken  Crisis resource information provided in the After Visit Summary  Referred patient back to mental health provider    Discussed the following ways the patient can remain in a safe environment:  be around others    Return in about 4 weeks (around 6/9/2021) for phone visit for depression, anxiety.    OSMAN Valencia [facility] KIERRA Nava is a 35 year old who presents for the following health issues     HPI   Acute Illness  Acute illness concerns: Throat pain   Onset/Duration: 5/9/2021  Symptoms:  Fever: no  Chills/Sweats: no  Headache (location?): no  Sinus Pressure: no  Conjunctivitis:  no  Ear Pain: no  Rhinorrhea: no  Congestion: no  Sore Throat: YES- discomfort   Cough: no  Wheeze: no  Decreased Appetite: no  Nausea: no  Vomiting: no  Diarrhea: no  Dysuria/Freq.: no  Dysuria or Hematuria: no  Fatigue/Achiness: no  Sick/Strep Exposure: YES, exposure to co-worker's son.   Therapies tried and outcome: Hot tea and sleep     Abnormal Mood Symptoms  Onset/Duration: several years  Description: depression on and off for several years  Depression (if yes, do PHQ-9): YES  Anxiety (if yes, do ADEEL-7): YES  Accompanying Signs & Symptoms: anxiety  History:  Recent stress or major life event: YES  Prior depression or anxiety: yes  Precipitating or alleviating factors: None  Therapies tried and outcome: individual therapy and medication(s) Zoloft (sertraline) and gabapentin PRN for anxiety. Has not been able to get to his therapist for a couple of months due to  insurance issues but plans on returning soon. Medications have been helpful, he is interested in trying additional medication. He has recurrent thoughts of suicide and that he would be better off dead for many years. This is unchanged for him. Denies active suicide plan and feels safe.   PHQ 3/19/2019 3/19/2019 5/12/2021   PHQ-9 Total Score 15 15 22   Q9: Thoughts of better off dead/self-harm past 2 weeks Several days Several days Nearly every day   F/U: Thoughts of suicide or self-harm Yes - -   F/U: Safety concerns No - -     No flowsheet data found.    Review of Systems   Constitutional, HEENT, cardiovascular, pulmonary, gi and gu systems are negative, except as otherwise noted.      Objective    /72   Pulse 68   Temp 97.7  F (36.5  C)   Resp 16   SpO2 100%   There is no height or weight on file to calculate BMI.  Physical Exam   GENERAL: healthy, alert and no distress  EYES: Eyes grossly normal to inspection, PERRL and conjunctivae and sclerae normal  HENT: normal cephalic/atraumatic, ear canals and TM's normal, nose and mouth without ulcers or lesions and posterior oropharynx with erythema and exudate  NECK: no adenopathy, no asymmetry, masses, or scars and thyroid normal to palpation  RESP: lungs clear to auscultation - no rales, rhonchi or wheezes  CV: regular rate and rhythm, normal S1 S2, no S3 or S4, no murmur, click or rub, no peripheral edema and peripheral pulses strong  SKIN: no suspicious lesions or rashes  PSYCH: mentation appears normal, affect normal/bright    Results for orders placed or performed in visit on 05/12/21 (from the past 24 hour(s))   Streptococcus A Rapid Scr w Reflx to PCR    Specimen: Throat   Result Value Ref Range    Strep Specimen Description Throat     Streptococcus Group A Rapid Screen Negative NEG^Negative

## 2021-05-12 NOTE — PATIENT INSTRUCTIONS
"  Instructions for Patients  Patient Education   After Your COVID-19 (Coronavirus) Test  You have been tested for COVID-19 (coronavirus).   If you'll have surgery in the next few days, we'll let you know ahead of time if you have the virus. Please call your surgeon's office with any questions.  For all other patients: Results are usually available in [email or web address] within 2 to 3 days.   If you do not have a [employer] account, you'll get a letter in the mail in about 7 to 10 days.   [employer] is often the fastest way to get test results. Please sign up if you do not already have a [employer] account. See the handout Getting COVID-19 Test Results in [employer] for help.  What if my test result is positive?  If your test is positive and you have not viewed your result in [email or web address], you'll get a phone call with your result. (A positive test means that you have the virus.)     Follow the tips under \"How do I self-isolate?\" below for 10 days (20 days if you have a weak immune system).    You don't need to be retested for COVID-19 before going back to school or work. As long as you're fever-free and feeling better, you can go back to school, work and other activities after waiting the 10 or 20 days.  What if I have questions after I get my results?  If you have questions about your results, please visit our testing website at [email or web address].   After 7 to 10 days, if you have not gotten your results:     Call [phone] ([phone]) and ask to speak with our COVID-19 results team.    If you're being treated at an infusion center: Call your infusion center directly.  What are the symptoms of COVID-19?  Cough, fever and trouble breathing are the most common signs of COVID-19.  Other symptoms can include new headaches, new muscle or body aches, new and unexplained fatigue (feeling very tired), chills, sore throat, congestion (stuffy or runny nose), diarrhea (loose poop), loss of taste or smell, belly " "pain, and nausea or vomiting (feeling sick to your stomach or throwing up).  You may already have symptoms of COVID-19, or they may show up later.  What should I do if I have symptoms?  If you're having surgery: Call your surgeon's office.  For all other patients: Stay home and away from others (self-isolate) until ...    You've had no fever--and no medicine that reduces fever--for 1 full day (24 hours), AND    Other symptoms have gotten better. For example, your cough or breathing has improved, AND    At least 10 days have passed since your symptoms first started.  How do I self-isolate?    Stay in your own room, even for meals. Use your own bathroom if you can.    Stay away from others in your home. No hugging, kissing or shaking hands. No visitors.    Don't go to work, school or anywhere else.    Clean \"high touch\" surfaces often (doorknobs, counters, handles). Use household cleaning spray or wipes. You'll find a full list of  on the EPA website: www.epa.gov/pesticide-registration/list-n-disinfectants-use-against-sars-cov-2.    Cover your mouth and nose with a mask or other face covering to avoid spreading germs.    Wash your hands and face often. Use soap and water.    Caregivers in these groups are at risk for severe illness due to COVID-19:  ? People 65 years and older  ? People who live in a nursing home or long-term care facility  ? People with chronic disease (lung, heart, cancer, diabetes, kidney, liver, immunologic)  ? People who have a weakened immune system, including those who:    Are in cancer treatment    Take medicine that weakens the immune system, such as corticosteroids    Had a bone marrow or organ transplant    Have an immune deficiency    Have poorly controlled HIV or AIDS    Are obese (body mass index of 40 or higher)    Smoke regularly    Caregivers should wear gloves while washing dishes, handling laundry and cleaning bedrooms and bathrooms.    Use caution when washing and drying " laundry: Don't shake dirty laundry and use the warmest water setting that you can.    For more tips on managing your health at home, go to www.cdc.gov/coronavirus/2019-ncov/downloads/10Things.pdf.  How can I take care of myself at home?  1. Get lots of rest. Drink extra fluids (unless a doctor has told you not to).  2. Take Tylenol (acetaminophen) for fever or pain. If you have liver or kidney problems, ask your family doctor if it's OK to take Tylenol.   Adults can take either:  ? 650 mg (two 325 mg pills) every 4 to 6 hours, or   ? 1,000 mg (two 500 mg pills) every 8 hours as needed.  ? Note: Don't take more than 3,000 mg in one day. Acetaminophen is found in many medicines (both prescribed and over-the-counter medicines). Read all labels to be sure you don't take too much.   For children, check the Tylenol bottle for the right dose. The dose is based on the child's age or weight.  3. If you have other health problems (like cancer, heart failure, an organ transplant or severe kidney disease): Call your specialty clinic if you don't feel better in the next 2 days.  4. Know when to call 911. Emergency warning signs include:  ? Trouble breathing or shortness of breath  ? Chest pain or pressure that doesn't go away  ? Feeling confused like you haven't felt before, or not being able to wake up  ? Bluish-colored lips or face  5. If your doctor prescribed a blood thinner medicine: Follow their instructions.  Where can I get more information?    [facility] - About COVID-19:   [email or web address]    CDC - If You're Sick: cdc.gov/coronavirus/2019-ncov/about/steps-when-sick.html    CDC - Ending Home Isolation: www.cdc.gov/coronavirus/2019-ncov/hcp/disposition-in-home-patients.html    CDC - Caring for Someone: www.cdc.gov/coronavirus/2019-ncov/if-you-are-sick/care-for-someone.html    [facility] - Interim Guidance for Hospital Discharge to Home:  www.health.[facility].[place].us/diseases/coronavirus/hcp/hospdischarge.pdf    [facility] clinical trials (COVID-19 research studies): [email or web address].[facility]/n-clinical-trials    Below are the COVID-19 hotlines at the [place] Department of Health ([facility]). Interpreters are available.  ? For health questions: Call 000-983-0543 or 1-381.427.2413 (7 a.m. to 7 p.m.)  ? For questions about schools and childcare: Call 032-210-0150 or 1-211.961.7961 (7 a.m. to 7 p.m.)    For informational purposes only. Not to replace the advice of your health care provider. Clinically reviewed by Infection Prevention and the [facility] COVID-19 Clinical Team. Copyright   2020 [facility]. All rights reserved. [employer] 721155 - Rev 11/11/20.             Thank you for taking steps to prevent the spread of this virus.  o Limit your contact with others.  o Wear a simple mask to cover your cough.  o Wash your hands well and often.  o If you need medical care, go to [email or web address] or contact your health care provider.     For more about COVID-19 and caring for yourself at home, visit the CDC website at https://www.cdc.gov/coronavirus/2019-ncov/about/steps-when-sick.html.     To learn about care at [facility], please go to [email or web address].     [facility] clinical trials (COVID-19 research studies): [email or web address].[facility]/n-clinical-trials.    Below are the COVID-19 hotlines at the [place] Department of Health ([facility]). Interpreters are available.     For health questions: Call 440-749-6512 or 1-826.760.9852 (7 a.m. to 7 p.m.)    For questions about schools and childcare: Call 715-950-6980 or 1-775.668.4911 (7 a.m. to 7 p.m.)      Patient Education     Major Depression  What is depression?   Depression is a serious mood disorder that affects your whole body including your mood and thoughts. It touches every part of your life. It s important to know  "that depression is not a personal weakness or character flaw. Treatment is often needed.   If you have one episode of depression, you are at risk of having more throughout life.  If you don t get treatment, depression can happen more often and be more serious.   What causes depression?   Researchers are studying the causes of depression. Several factors seem to play a role. It may be caused by chemical changes in the brain. It also tends to run in families. Depression can be triggered by life events or certain illnesses. It can also develop without a clear trigger.   What are the symptoms of depression?   While each person may experience symptoms differently, these are the most common symptoms of depression:     Lasting sad, anxious, or  empty  mood    Loss of interest in almost all activities    Appetite and weight changes    Changes in sleep patterns, such as inability to sleep or sleeping too much    Slowing of physical activity, speech, and thinking OR agitation, increased restlessness, and irritability    Decreased energy, feeling tired or \"slowed down\" almost every day    Ongoing feelings of worthlessness or feelings of undue guilt    Trouble concentrating or making decisions    Repeating thoughts of death or suicide, wishing to die, or attempting suicide ( Note: This needs emergency treatment )  If you have 5 or more of these symptoms for at least 2 weeks, you may be diagnosed with depression. These symptoms would be a noticeable change from what s  normal  for you   The symptoms of depression may look like other mental health conditions. Always see a healthcare provider for a diagnosis.   How is depression diagnosed?   Depression can happen along with other medical conditions. These include heart disease, or cancer, as well as other mental health conditions. Early diagnosis and treatment is key to recovery.   A diagnosis is made after a careful mental health exam and medical history done. This is usually " done by a mental health professional.   How is depression treated?   Treatment for depression may include one or a combination of the following:    Medicine. Antidepressants work by affecting the brain chemicals. Know that it takes 4 to 8 weeks for these medicines to have a full effect. Keep taking the medicine, even if it doesn t seem to be working at first. Never stop taking your medicine without first talking to your healthcare provider. Some people have to switch medicines or add medicines to get results. Work closely with your healthcare provider to find treatment that works for you.    Therapy. This is most often cognitive behavioral or interpersonal therapy. It focuses on changing the distorted views you have of yourself and your situation. It also works to improve relationships, and identify and manage stressors in your life.    Electroconvulsive therapy (ECT). This treatment may be used to treat severe, life-threatening depression that has not responded to medicines. A mild electrical current is passed through the brain. This triggers a brief seizure. For unknown reasons, the seizures help restore the normal balance of chemicals in the brain and ease symptoms.  With treatment, you should start to feel better within a few weeks. Without treatment, symptoms can last for weeks, months, or even years. Continued treatment may help to prevent depression from appearing again.   Depression can make you feel exhausted, worthless, helpless, and hopeless. It s important to realize that these negative views are part of the depression and don't reflect reality. Negative thinking fades as treatment starts to take effect. Meanwhile, consider the following:     Get help. Some research shows that if depression is treated as soon as possible, long-term problems are decreased. If you think you may be depressed, see a healthcare provider as soon as possible.    Set realistic goals in light of the depression and don t take on  "too much.    Break large tasks into small ones. Set priorities, and do what you can as you can.    Try to be with other people and confide in someone. It s usually better than being alone and secretive.    Do things that make you feel better. Going to a movie, gardening, or taking part in Yazidi, social, or other activities may help. Doing something nice for someone else can also help you feel better.    Get regular exercise, studies show exercise can improve mood.    Expect your mood to get better slowly, not right away. Feeling better takes time.    Eat healthy, well-balanced meals.    Stay away from alcohol and drugs. These can make depression worse.    It's best to delay important decisions until the depression has lifted. Before deciding to make a big change--change jobs, get  or --discuss it with others who know you well and have a more objective view of your situation.    Remember: People don t \"snap out of\" a depression. But they can feel a little better day-by-day.    Try to be patient and focus on the positives. This may help replace the negative thinking that is part of the depression. The negative thoughts will fade as your depression responds to treatment.    Let your family and friends help you  When should I call my healthcare provider?  If you have 5 or more of these symptoms for at least 2 weeks, call your healthcare provider:     Lasting sad, anxious, or  empty  mood    Loss of interest in almost all activities    Appetite and weight changes    Changes in sleep patterns, such as inability to sleep or sleeping too much    Slowing of physical activity, speech, and thinking OR agitation, increased restlessness, and irritability    Decreased energy, feeling tired or \"slowed down\" almost every day    Ongoing feelings of worthlessness or feelings of undue guilt    Trouble concentrating or making decisions    Repeating thoughts of death or suicide, wishing to die, or attempting suicide ( " Note: This needs emergency treatment )    If you have thoughts of harming yourself, tell someone right away. Call 911 or go to a hospital emergency room. Ask a friend or family member to stay with you. Don't stay alone. You can also call the toll-free 24-hour hotline of the National Suicide Prevention Lifeline at 939-376-WLLC (925-473-8366); TTY: 138-957-8XFA (2586) and talk to a trained counselor.  Key points about depression    Depression is a serious mood disorder that affects your whole body including your mood and thoughts.    It s likely caused by several factors such as the environment or a a chemical imbalance in the brain . Some types of depression seem to run in families.    Depression causes ongoing, extreme feelings of sadness, helplessness, hopeless, and irritability. These feelings are usually a noticeable change from what s  normal  for you, and they last for more than 2 weeks.    Depression is most often treated with medicine or therapy, or a combination of both.    Next steps  Tips to help you get the most from a visit to your healthcare provider:    Know the reason for your visit and what you want to happen.    Before your visit, write down questions you want answered.    Bring someone with you to help you ask questions and remember what your provider tells you.    At the visit, write down the name of a new diagnosis, and any new medicines, treatments, or tests. Also write down any new instructions your provider gives you.    Know why a new medicine or treatment is prescribed, and how it will help you. Also know what the side effects are.    Ask if your condition can be treated in other ways.    Know why a test or procedure is recommended and what the results could mean.    Know what to expect if you do not take the medicine or have the test or procedure.    If you have a follow-up appointment, write down the date, time, and purpose for that visit.    Know how you can contact your provider if you  have questions.  Stay[employer]me last reviewed this educational content on 3/1/2019    1550-5806 [employer]. All rights reserved. This information is not intended as a substitute for professional medical care. Always follow your healthcare professional's instructions.

## 2021-05-13 LAB
LABORATORY COMMENT REPORT: NORMAL
SARS-COV-2 RNA RESP QL NAA+PROBE: NEGATIVE
SPECIMEN SOURCE: NORMAL

## 2021-05-13 ASSESSMENT — ASTHMA QUESTIONNAIRES: ACT_TOTALSCORE: 23

## 2021-09-09 NOTE — PATIENT INSTRUCTIONS
2001, Medical record Number: [id number], was seen in the [facility] CMS/[facility]) I will look into the Lyme testing. Probably unrelated to cough.     I will start montelukast for allergies/asthma. Take this every day. Another option is inhaled steroids.    In about a month, you don't notice an improvement in cough, we can try an inhaler, if no improvement in a couple of months, let's plan for a CT of the chest.    Stop taking tums. If you have heartburn, use H2 blocker or PPI (Zantac, omeprazole, etc)       

## 2021-10-03 ENCOUNTER — HEALTH MAINTENANCE LETTER (OUTPATIENT)
Age: 36
End: 2021-10-03

## 2022-01-07 DIAGNOSIS — F32.1 CURRENT MODERATE EPISODE OF MAJOR DEPRESSIVE DISORDER, UNSPECIFIED WHETHER RECURRENT (H): ICD-10-CM

## 2022-01-09 RX ORDER — BUPROPION HYDROCHLORIDE 150 MG/1
150 TABLET ORAL EVERY MORNING
Qty: 15 TABLET | Refills: 0 | Status: SHIPPED | OUTPATIENT
Start: 2022-01-09

## 2022-01-10 NOTE — TELEPHONE ENCOUNTER
Routing refill request to provider for review/approval because:  Jayne given x1 and patient did not follow up, please advise

## 2022-05-13 NOTE — TELEPHONE ENCOUNTER
Elijah has white spots on his throat.  Symptoms started last night.  Elijah has a history of bad gerd.  Today Elijah is requesting to be tested for strep.  Denies sore throat.  Elijah is requesting testing as if it is not strep he knows it will likely be related to Gerd.  Patient states that his health insurance has changed and now is requesting [facility] clinics.    COVID 19 Nurse Triage Plan/Patient Instructions    Please be aware that novel coronavirus (COVID-19) may be circulating in the community. If you develop symptoms such as fever, cough, or SOB or if you have concerns about the presence of another infection including coronavirus (COVID-19), please contact your health care provider or visit [email or web address].     Disposition/Instructions    In-Person Visit with provider recommended. Reference Visit Selection Guide.    Thank you for taking steps to prevent the spread of this virus.  o Limit your contact with others.  o Wear a simple mask to cover your cough.  o Wash your hands well and often.    Resources    [facility]: About COVID-19: [email or web address]    CDC: What to Do If You're Sick: www.cdc.gov/coronavirus/2019-ncov/about/steps-when-sick.html    CDC: Ending Home Isolation: www.cdc.gov/coronavirus/2019-ncov/hcp/disposition-in-home-patients.html     CDC: Caring for Someone: www.cdc.gov/coronavirus/2019-ncov/if-you-are-sick/care-for-someone.html     [facility]: Interim Guidance for Hospital Discharge to Home: www.health.[facility].mn.us/diseases/coronavirus/hcp/hospdischarge.pdf    [facility] clinical trials (COVID-19 research studies): clinicalaffairs.[facility]-clinical-trials     Below are the COVID-19 hotlines at the [facility] ([facility]). Interpreters are available.   o For health questions: Call 071-994-6502 or 1-678.190.9204 (7 a.m. to 7 p.m.)  o For questions about schools and childcare: Call 497-483-9351 or 1-967.270.8075 (7 a.m. to 7 p.m.)                        Reason for Disposition    Patient requesting a strep throat test    Additional Information    Negative: Severe difficulty breathing (e.g., struggling for each breath, speaks in single words)    Negative: Sounds like a life-threatening emergency to the triager    Negative: Drooling or spitting out saliva (because can't swallow)    Negative: Unable to open mouth completely    Negative: Drinking very little and has signs of dehydration (e.g., no urine > 12 hours, very dry mouth, very lightheaded)    Negative: Patient sounds very sick or weak to the triager    Negative: Difficulty breathing (per caller) but not severe    Negative: Fever > 103 F (39.4 C)    Negative: Refuses to drink anything for > 12 hours    Negative: Fever present > 3 days (72 hours)    Negative: SEVERE sore throat pain    Negative: Pus on tonsils (back of throat) and swollen neck lymph nodes ('glands')    Negative: Earache also present    Negative: Widespread rash (especially chest and abdomen)    Negative: Diabetes mellitus or weak immune system (e.g., HIV positive, cancer chemo, splenectomy, organ transplant, chronic steroids)    Negative: History of rheumatic fever    Negative: Patient wants to be seen    Protocols used: SORE THROAT-A-OH       - - -

## 2022-05-15 ENCOUNTER — HEALTH MAINTENANCE LETTER (OUTPATIENT)
Age: 37
End: 2022-05-15

## 2022-05-26 ENCOUNTER — TELEPHONE (OUTPATIENT)
Dept: FAMILY MEDICINE | Facility: CLINIC | Age: 37
End: 2022-05-26

## 2022-05-26 NOTE — TELEPHONE ENCOUNTER
Patient Quality Outreach    Patient is due for the following:   Depression  -  PHQ-9 Needed  Physical  - never done    NEXT STEPS:   Schedule a yearly physical and complete PHQ-9 sent via [employer]    Type of outreach:    Sent [employer] message.    Next Steps:  Reach out within 90 days via Phone.    Max number of attempts reached: No. Will try again in 90 days if patient still on fail list.    Questions for provider review:    None     Amanda Escalera MA

## 2022-08-12 NOTE — NURSING NOTE
"Chief Complaint   Patient presents with     Consult     GERD and esophagitis       Vitals:    04/16/19 1503   BP: 140/77   BP Location: Left arm   Patient Position: Sitting   Cuff Size: Adult Regular   Pulse: 61   Resp: 16   Temp: 98.1  F (36.7  C)   TempSrc: Oral   SpO2: 98%   Weight: 87 kg (191 lb 14.4 oz)   Height: 1.815 m (5' 11.46\")       Body mass index is 26.42 kg/m .      Elena Bernard LPN                          " no

## 2022-09-10 ENCOUNTER — HEALTH MAINTENANCE LETTER (OUTPATIENT)
Age: 37
End: 2022-09-10

## 2023-06-03 ENCOUNTER — HEALTH MAINTENANCE LETTER (OUTPATIENT)
Age: 38
End: 2023-06-03

## 2023-08-28 ASSESSMENT — ANXIETY QUESTIONNAIRES
5. BEING SO RESTLESS THAT IT IS HARD TO SIT STILL: SEVERAL DAYS
3. WORRYING TOO MUCH ABOUT DIFFERENT THINGS: SEVERAL DAYS
GAD7 TOTAL SCORE: 13
2. NOT BEING ABLE TO STOP OR CONTROL WORRYING: SEVERAL DAYS
IF YOU CHECKED OFF ANY PROBLEMS ON THIS QUESTIONNAIRE, HOW DIFFICULT HAVE THESE PROBLEMS MADE IT FOR YOU TO DO YOUR WORK, TAKE CARE OF THINGS AT HOME, OR GET ALONG WITH OTHER PEOPLE: EXTREMELY DIFFICULT
4. TROUBLE RELAXING: NEARLY EVERY DAY
7. FEELING AFRAID AS IF SOMETHING AWFUL MIGHT HAPPEN: SEVERAL DAYS
1. FEELING NERVOUS, ANXIOUS, OR ON EDGE: NEARLY EVERY DAY
GAD7 TOTAL SCORE: 13
6. BECOMING EASILY ANNOYED OR IRRITABLE: NEARLY EVERY DAY

## 2023-08-28 ASSESSMENT — PATIENT HEALTH QUESTIONNAIRE - PHQ9
10. IF YOU CHECKED OFF ANY PROBLEMS, HOW DIFFICULT HAVE THESE PROBLEMS MADE IT FOR YOU TO DO YOUR WORK, TAKE CARE OF THINGS AT HOME, OR GET ALONG WITH OTHER PEOPLE: EXTREMELY DIFFICULT
SUM OF ALL RESPONSES TO PHQ QUESTIONS 1-9: 23
SUM OF ALL RESPONSES TO PHQ QUESTIONS 1-9: 23

## 2023-08-29 ENCOUNTER — VIRTUAL VISIT (OUTPATIENT)
Dept: PSYCHOLOGY | Facility: CLINIC | Age: 38
End: 2023-08-29
Payer: COMMERCIAL

## 2023-08-29 DIAGNOSIS — F41.1 GENERALIZED ANXIETY DISORDER: ICD-10-CM

## 2023-08-29 DIAGNOSIS — F33.2 SEVERE RECURRENT MAJOR DEPRESSION WITHOUT PSYCHOTIC FEATURES (H): Primary | ICD-10-CM

## 2023-08-29 PROCEDURE — 90791 PSYCH DIAGNOSTIC EVALUATION: CPT | Mod: GT | Performed by: PSYCHOLOGIST

## 2023-08-29 ASSESSMENT — COLUMBIA-SUICIDE SEVERITY RATING SCALE - C-SSRS
1. IN THE PAST MONTH, HAVE YOU WISHED YOU WERE DEAD OR WISHED YOU COULD GO TO SLEEP AND NOT WAKE UP?: YES
4. HAVE YOU HAD THESE THOUGHTS AND HAD SOME INTENTION OF ACTING ON THEM?: YES
ATTEMPT LIFETIME: NO
4. HAVE YOU HAD THESE THOUGHTS AND HAD SOME INTENTION OF ACTING ON THEM?: NO
3. HAVE YOU BEEN THINKING ABOUT HOW YOU MIGHT KILL YOURSELF?: YES
TOTAL  NUMBER OF ABORTED OR SELF INTERRUPTED ATTEMPTS LIFETIME: 1
TOTAL  NUMBER OF ABORTED OR SELF INTERRUPTED ATTEMPTS LIFETIME: YES
TOTAL  NUMBER OF INTERRUPTED ATTEMPTS LIFETIME: NO
REASONS FOR IDEATION PAST MONTH: MOSTLY TO END OR STOP THE PAIN (YOU COULDN'T GO ON LIVING WITH THE PAIN OR HOW YOU WERE FEELING)
2. HAVE YOU ACTUALLY HAD ANY THOUGHTS OF KILLING YOURSELF?: NO
TOTAL  NUMBER OF ABORTED OR SELF INTERRUPTED ATTEMPTS PAST 3 MONTHS: NO
2. HAVE YOU ACTUALLY HAD ANY THOUGHTS OF KILLING YOURSELF?: YES
REASONS FOR IDEATION LIFETIME: MOSTLY TO END OR STOP THE PAIN (YOU COULDN'T GO ON LIVING WITH THE PAIN OR HOW YOU WERE FEELING)
6. HAVE YOU EVER DONE ANYTHING, STARTED TO DO ANYTHING, OR PREPARED TO DO ANYTHING TO END YOUR LIFE?: NO
1. HAVE YOU WISHED YOU WERE DEAD OR WISHED YOU COULD GO TO SLEEP AND NOT WAKE UP?: YES
5. HAVE YOU STARTED TO WORK OUT OR WORKED OUT THE DETAILS OF HOW TO KILL YOURSELF? DO YOU INTEND TO CARRY OUT THIS PLAN?: NO

## 2023-08-29 NOTE — PROGRESS NOTES
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 Client strengths include:  educated, employed, goal-focused, good listener, has a previous history of therapy, insightful, intelligent, motivated, open to learning, open to suggestions / feedback, and responsible parent .     Recommendations:     1. Plan for Safety and Risk Management:   Safety and Risk: Recommended that patient call 911 or go to the local ED should there be a change in any of these risk factors..          Report to child / adult protection services was NA.      4. Resources/Service Plan:    services are not indicated.   Modifications to assist communication are not indicated.   Additional disability accommodations are not indicated.      5. Collaboration:   Collaboration / coordination of treatment will be initiated with the following  support professionals: primary care physician and outpatient therapist.      6.  Referrals:   The following referrals will be initiated:  NA . Next Scheduled Appointment: NA.      A Release of Information has been obtained for the following: outpatient therapist - TBD if NANCY is warranted.     Emergency Contact  was not obtained.     7. ARSH:    ARSH:  Discussed the general effects of drugs and alcohol on health and well-being. Provider gave patient printed information about the  effects of  chemical use on their health and well being. Recommendations:  NA .     8. Records:   These were reviewed at time of assessment.   Information in this assessment was obtained from the medical record and  provided by patient who is a good historian.    Patient will have open access to their mental health medical record.    9.   Interactive Complexity: No    Provider Name/ Credentials:  Luz Harkins, PhD, LP  August 29, 2023

## 2023-09-06 ENCOUNTER — VIRTUAL VISIT (OUTPATIENT)
Dept: PSYCHOLOGY | Facility: CLINIC | Age: 38
End: 2023-09-06
Payer: COMMERCIAL

## 2023-09-06 DIAGNOSIS — F33.2 SEVERE RECURRENT MAJOR DEPRESSION WITHOUT PSYCHOTIC FEATURES (H): ICD-10-CM

## 2023-09-06 DIAGNOSIS — F41.1 GENERALIZED ANXIETY DISORDER: Primary | ICD-10-CM

## 2023-09-06 PROCEDURE — 90834 PSYTX W PT 45 MINUTES: CPT | Mod: VID | Performed by: PSYCHOLOGIST

## 2023-09-06 NOTE — PROGRESS NOTES
Progress Note       Client Name:               Elijah Biswas          Date:   9/6/2023         Service Type:             Individual      Telemedicine Visit: The patient's condition can be safely assessed and treated via synchronous audio and visual telemedicine encounter.        Reason for Telemedicine Visit: Services only offered telehealth      Originating Site (Patient Location): Patient's place of employment           Distant Location (provider location):? Off-site      Consent:  The patient/guardian has verbally consented to: the potential risks and benefits of telemedicine (video visit) versus in person care; bill my insurance or make self-payment for services provided; and responsibility for payment of non-covered services.       Mode of Communication:? Video Conference via [employer]      As the provider I attest to compliance with applicable laws and regulations related to telemedicine.      Session Start Time:              8:00                          Session End Time: 8:46      Session Length:      46 minutes      Session #:     2       Attendees:     Client attended alone      Intervention: reviewed strategies for managing depression and anxiety; motivational interviewing: explored potential barriers for making healthy changes      Identifying Information:   Client is a 37 year old, ,  male. Client was referred for a diagnostic assessment by PCP.  The purpose of this evaluation is to: provide treatment recommendations and clarify diagnosis.  Client is currently employed full time and reports he is able to function appropriately at work. Client attended the session alone.          Client's Statement of Presenting Concern:   Client reported seeking services at this time for diagnostic assessment and recommendations for treatment. Client's presenting concerns include: difficulty focusing, poor concentration, difficulty managing time. Client is forgetful. He has difficulty managing his time.  "He has tried to organize things in various ways, but if he writes a list, he forgets to look at it. He has tried to use day planners, but he doesn't keep up with it and hasn't found it to be helpful. He likes to do hands on work, and can't focus when he is doing writing. Concentration has been a struggle. He feels easily distracted and is side-tracked. Client is usually fidgety/restless. In school, he always had fidget toys in his pockets so that he could play with them in his hands during class. When he is in meetings, he will be playing with the fabric of his shirt and \"folding it.\" He will pick at his nails. It is difficult to sit still. He has difficulty listening in conversations and has to ask people to repeat themselves. He can interrupt people at times and is sometimes embarrassed by how much he talked. He likes to \"tell people cool facts about things.\" He likes sharing information with other people. He has difficulty with organizing paper files, but he is generally pretty organized when it comes to electronic documents/files. Client finds it difficult to relax and read. His home life is busy and stressful managing the children and their needs. He likes to have 45 minutes of his commute home to decompress. Sometimes he gets up during TV shows, but this upsets his wife, so he tries to minimize the disruptions. Client often has a lot of projects going on at once. He doesn't often have a lot of time to work on projects (he is often interrupted by his family or his children). He has a lot of anxiety when he gets home from work that he will regulate emotions with food (\"I will binge\"). He will procrastinate going to sleep, but his wife wants him to go to bed when she does. His therapist recommended ADHD testing. Client stated that symptoms have resulted in the following functional impairments: home life with wife and kids, management of the household and or completion of tasks, relationships, and self-care.    " "       History of Presenting Concern:   Client reported that he has not completed a previous ADHD diagnostic assessment. Client has not received a previous diagnosis of ADHD.  Client reported that medication has not been prescribed medication to address these problems. Client reported that these problem(s) began in childhood. Client has not attempted to resolve these concerns in the past. Client reported that other professionals are involved in providing support / services. These include  medications from PCP (Zoloft and Wellbutrin). Client is in therapy through [facility]. He has been working with his therapist for 2 years. This has been helpful.           Social History:   As a child, client reported that he did not struggle with ADHD issues in childhood. He would procrastinate on homework, but he had to follow rules. Client had to complete chores (\"dissent wasn't allowed\"). His parents managed his time. He had to keep his room clean (he has OCD and didn't lose things). He carried all of his belongings with him and didn't forget items between home and school. He would do homework 15 minutes before class. He explained that he grew up in a \"very authoritarian household\" and there were repercussions for not following the rules. Client reported difficulty with childhood peer relationships. He was shy and teased/bullied. As a child, client reported having sleep disturbance, including: frequent dreams / nightmares. Client reported currently experiencing sleep disturbance, including: insomnia (can't fall asleep or stay asleep). He often feels drowsy in a seminar or at work \"somewhere where I shouldn't be sleepy.\"  Client reported sleeping approximately 6 hours per night. Client reported that he has completed a sleep study.  He was not diagnosed with any sleep disorders. Client reported having a well balanced diet. There are not significant nutritional concerns.  Client reported sporadic exercise patterns.    " "  Client's highest education level was graduate school. Client graduated high school in 2005. He estimated he obtained mostly As and Bs. During the elementary, middle, and high school years, patient recalls academic strengths in the area of math and science. Client reported experiencing academic problems in choir. He was successful in music, but he didn't practice and didn't understand how to read music \"on the spot.\" Client did identify the following learning problems: concentration. Client did not receive tutoring services during the school years. Client did not receive special education services. He was in advanced courses. Client reported failure to finish or complete homework. He did not have attendance issues. He often procrastinated and finished homework 15 minutes before it was due in class. He would forget that he had an assignment and then rush to pull something together so that he had something to turn in. He was able to do well. He noted \"this was a very strong expectation.\"  Client was often doodling on his notebook and playing games on his calculator. He felt that playing calculator games actually helped him to listen and focus. He was not disruptive. He might \"call out the teachers on things related to content.\"    Client did attend post-secondary school. He graduated from college in 2009 from [employer] with a degree in biochemistry and microbiology. He reported that the first year of college was \"real rough.\" He had found high school to be easy and expected college to be the same, but it was not. He had to learn how to take notes and study and how to manage his time (e.g., start working on things earlier to get them done on time). Sometimes he procrastinated and was rushing the night before to get things done. He didn't skip class or have attendance issues. Client would chat with people around him in class. He liked being able to interact with his classmates and with the professor. Client has a doctorate " "degree in microbiology, biochemistry and biophysics. He obtained his doctorate degree in 2018 from [employer]. He didn't get a lot of sleep during graduate school (he would stay up late and only sleep 2 hours at a time). He did well on coursework and got things done. He struggled more with the unstructured aspect of things. He would \"go down rabbit holes\" on projects and had difficulty prioritizing components to his projects. He felt he took on \"way too many responsibilities.\" He was in school, managing the lab, maintaining stocks, organizing the files, training lab assistants, etc. Client struggled with deadlines. He would hyper-focus on one task and neglect to include other pieces.         Client reported that he is currently employed full-time. 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 He has been at his job for 1.5 years. Client reported that the current job is a good fit for his skills and personality. He feels he is under-performing at work. Client reported that he been frequently late for work, frequently made mistakes with poor attention to detail, often felt bored, often been late in completing projects, and distractible behavior. Client has had issues with \"perception that I wasn't doing my work even though I was.\" He explained that he was getting all of his work done and that he was involved in a number of other things. He has trouble showing up to work on time. During meetings, he is often working on something else. He has difficulty keeping all of his data organized \"in a cohesive format\" which has led to errors and makes it hard for him to write reports that he needs to write. Client has to put completed documents into 4 different places, and he has had difficulty remembering where to put things (\"it has been trial by fire\"). He can feel bored at times. This was an issue in graduate school and at his post-doc (\"it would be impossible to get the motivation to start stupid little things and I " "would end up just staring at the computer and forgetting where I was and what I was going to be doing. \" Client feels that he is distracted often by talking to other people. He has a hard time meeting deadlines. He gets distracted by having conversations with others. He is typically organized. He doesn't like a messy desk top. 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 The longest period of employment has been 1.5 years. Client has not been terminated from a place of employment. He has been \"let go\" from a job in the past (he was told they were downsizing and it was due to lack of funds \"and I wasn't showing enough urgency\").           Risk Taking Behaviors:   Client reported a history of the following risk taking behaviors: substance use and described as a \"risk taker\" by others ; has had periods where he was using alcohol \"more than I should have been\" during graduate school and his post-doc; he likes \"adrenaline rushes\"; he can be meticulous when he plans things \"because I don't like making mistakes\", but he can also be impulsive and say \"screw it and hope for the best\"        Motor Vehicle Operation:   Client has received a 's license.  Client has not received any moving violations.  Client reported the following driving habits: frequently late for appointments, meetings, or work and often exceeds the speed limit / speeds. He used to have more road rage in his younger years. He has worked on this. He listens to podcasts when he drives which helps him to focus. He has a lot of \"micro sleeps\" while driving (\"sometimes driving on autopilot but sometimes having dreams\"). He drives \"defensively.\" According to client, other people are comfortable riding as a passenger when he is driving.           Mental Status Assessment:   Appearance:                       " "          Appropriate    Eye Contact:                                 Good    Psychomotor Behavior:        Normal    Attitude:                                            Cooperative    Orientation:                                    All   Speech   Rate / Production:     Normal    Volume:                            Normal    Mood:                                                 Normal   Affect:                                                 Appropriate    Thought Content:                      Clear    Thought Form:                             Coherent  Logical    Insight:                                                              Good          Review of Symptoms:   Depression:     Change in sleep, Lack of interest, Excessive or inappropriate guilt, Change in energy level, Difficulties concentrating, Change in appetite, Psychomotor slowing or agitation, Suicidal ideation, and Feeling sad, down, or depressed  Miranda:             No Symptoms  Psychosis:       No Symptoms  Anxiety:           Excessive worry, Nervousness, Sleep disturbance, Psychomotor agitation, Ruminations, Poor concentration, and Irritability  Panic:              No symptoms- had some in 2020 when he nad his wife were having marriage difficulties   Post Traumatic Stress Disorder:  Experienced traumatic event emotional abuse    Eating Disorder:          No Symptoms  Conduct Disorder:       No symptoms  Autism Spectrum Disorder:     No symptoms  Obsessive Compulsive Disorder:       Checking and numbers   I like things in 3s\" - has learned coping mechanisms  ADD / ADHD:               Attention Listening Task Completion Distractiblity Forgetful   Reckless Behavior:  Impulsive Decision Making            Safety Issues and Plan for Safety and Risk Management:   Client has had a history of Client explained that he has had SI \"consistently for av very long time\" since [employer].\" Client has considered overdosing on his dog's medication. In his first " "year of college, \"I almost jumped off a 10 story building. I was up there and standing on the railing. At the time I was pretty [employer] and didn't want to go to hell. \" He used to bike everywhere and \"had daydreams about biking into traffic.\" Client has engaged in cutting (started in college - \"it was a maladaptive coping mechanism for when things got really bad\") - it wasn't consistent. The last time he cut himself was in 2020/2021.      Client explained he has passive thoughts of SI \"they've mostly been passive over time.\" He has become familiar with them. He feels he can manage them well. He has a safety plan with his current therapist. He denied plans or intent to harm himself.      Client denies current fears or concerns for personal safety.   Client denies current or recent suicidal ideation or behaviors.   Client denies current or recent homicidal ideation or behaviors.   Client denies current or recent self injurious behavior or ideation.   Client denies other safety concerns.   Client reports there are no firearms in the house.   Recommended that patient call 911 or go to the local ED should there be a change in any of these risk factors.            Diagnostic Criteria:   Attention Deficit Hyperactivity Disorder  A) A persistent pattern of inattention and/or hyperactivity-impulsivity that interferes with functioning or development, as characterized by (1) Inattention and/or (2) Hyperactivity and Impulsivity  - Often has difficulty sustaining attention in tasks or play activities  - Often does not seem to listen when spoken to directly  - Often does not follow through on instructions and fails to finish schoolwork, chores, or duties in the workplace  - Often has difficulty organizing tasks and activities  - Often avoids, dislikes, or is reluctant to engage in tasks that require sustained mental effort  - Is often easily distractedby extraneous stimuli  - Is often forgetful in daily activities  - Often " "fidgets with or taps hands or feet or squirms in seat  - Is often \"on the go,\" acting as if \"driven by a motor\"  - Often talks excessively       Generalized Anxiety Disorder  A. Excessive anxiety and worry about a number of events or activities (such as work or school performance).   B. The person finds it difficult to control the worry.  C. Select 3 or more symptoms (required for diagnosis). Only one item is required in children.   - Restlessness or feeling keyed up or on edge.    - Being easily fatigued.    - Difficulty concentrating or mind going blank.    - Irritability.    - Muscle tension.    - Sleep disturbance (difficulty falling or staying asleep, or restless unsatisfying sleep).   D. The focus of the anxiety and worry is not confined to features of an Axis I disorder.  E. The anxiety, worry, or physical symptoms cause clinically significant distress or impairment in social, occupational, or other important areas of functioning.   F. The disturbance is not due to the direct physiological effects of a substance (e.g., a drug of abuse, a medication) or a general medical condition (e.g., hyperthyroidism) and does not occur exclusively during a Mood Disorder, a Psychotic Disorder, or a Pervasive Developmental Disorder.     Major Depressive Disorder  A) Recurrent episode(s) - symptoms have been present during the same 2-week period and represent a change from previous functioning 5 or more symptoms (required for diagnosis)   - Depressed mood. Note: In children and adolescents, can be irritable mood.     - Diminished interest or pleasure in all, or almost all, activities.    - Significant weight loss when not dieting decrease in appetite.    - Decreased sleep.    - Psychomotor activity agitation.    - Fatigue or loss of energy.    - Feelings of worthlessness or inappropriate and excessive guilt.    - Diminished ability to think or concentrate, or indecisiveness.    - Recurrent thoughts of death (not just fear of " dying), recurrent suicidal ideation without a specific plan, or a suicide attempt or a specific plan for committing suicide.   B) The symptoms cause clinically significant distress or impairment in social, occupational, or other important areas of functioning  C) The episode is not attributable to the physiological effects of a substance or to another medical condition  D) The occurence of major depressive episode is not better explained by other thought / psychotic disorders  E) There has never been a manic episode or hypomanic episode      Functional Status:   Client's symptoms have caused reduced functional status in the following areas: home life with wife and kids, management of the household and or completion of tasks, relationship(s), and self-care.            DSM-5Diagnoses: (Sustained by DSM5 Criteria Listed Above)      296.33 (F33.2) Major Depressive Disorder, Recurrent Episode, Severe   300.02 (F41.1) Generalized Anxiety Disorder  RULE OUT: ADHD    Attendance Agreement:   Client has signed Attendance Agreement:No: unable to sign via telehealth         Preliminary Plan:   The client reports no currently identified Confucianism, ethnic or cultural issues relevant to therapy.       services are not indicated.      Modifications to assist communication are not indicated.      Collaboration / coordination of treatment will be initiated with the following support professionals: primary care physician and outpatient therapist.      Referral to another professional/service is not indicated at this time..      A Release of Information has been obtained for the following: TBD if NANCY for therapist is warranted.      Client was given self and collaborative rating scales to be completed prior to the next appointment.  Client consented to sending/receiving these measures via email.  Requested measures be sent to: [email or web address]     Depression and anxiety rating scales were completed.  A third appointment  was not scheduled at this time.       Report to child / adult protection services was NA.      Patient will have open access to their mental health medical record.      Luz Harkins, PhD, LP                         September 6, 2023

## 2023-09-07 ENCOUNTER — DOCUMENTATION ONLY (OUTPATIENT)
Dept: PSYCHOLOGY | Facility: CLINIC | Age: 38
End: 2023-09-07
Payer: COMMERCIAL

## 2023-09-07 NOTE — PROGRESS NOTES
Name: Elijah Biswas   MRN: [id number]  : 1985    Client completed the [place] Multiphasic Personality Inventory-2 (MMPI-2), a self-report personality inventory, as part of his evaluation. Validity scales indicate that the client responded in an open and consistent manner, resulting in a valid profile. While overreporting is possible, it is also likely that the Client has limited resources for coping with the stresses and demands of daily life. The following results should be interpreted with caution and in light of other information. His profile reflects a high level of general emotional distress. Individuals with similar profiles experience depression, anxiety, worry, nervousness, intrusive thoughts, insecurity, and apprehensiveness. They are concerned about their general health status and physical functioning and have a tendency to develop physical symptoms in response to stress. They feel nervous, stressed out, and vulnerable to upset by disappointment or decisions that don't work out. They experienced disturbed sleep and problems with attention and concentration. They may experience a sense of mental failure or decline and the depletion of energy needed to accomplish mental work. Thinking and problem-solving are experienced as effortful and as subject to going off course even when significant effort is made. Thinking may be viewed as impaired or unreliable; they may have a sense that  I can't seem to get my mind right.  Individuals with similar profiles report impediments to performance in the workplace including apathy, fatigue, feeling overwhelmed, distracted, and indecisive. They demonstrate overly busy but massively inefficient cognitive activity. They are likely overwhelmed by the endless supply of considerations brought to bear in decision making. They have a tendency to overindulge in daydreaming.

## 2023-10-10 ENCOUNTER — DOCUMENTATION ONLY (OUTPATIENT)
Dept: PSYCHOLOGY | Facility: CLINIC | Age: 38
End: 2023-10-10
Payer: COMMERCIAL

## 2023-10-10 NOTE — PROGRESS NOTES
"Name: Elijah Biswas   MRN: [id number]  : 1985     Aaron Adult ADHD Rating Scale-IV: Self and Other Reports (BAARS-IV)   The BAARS-IV assesses for symptoms of ADHD that are experienced in one's daily life. This assessment measure includes self and collateral rating scales designed to provide information regarding current and childhood symptoms of ADHD including inattention, hyperactivity, and impulsivity. Self-report scores are reported as percentiles. Scores at the 76th-83rd percentile are considered marginal, scores at the 84th-92nd percentile are considered borderline, scores at the 93rd-95th percentile are considered mild, scores at the 96th-98th percentile are considered moderate, and those at the 99th percentile are considered severe. Collateral or \"other\" rating scales are reported as number of symptoms observed in comparison to those reported by the client. Norms and percentile scores are not available for collateral reports.    ?   Current Symptoms Scale--Self Report:    Client completed the self-report inventory of current symptoms. The results indicate that the client's Total ADHD Score was 51 which places them in the 98th percentile for overall ADHD symptoms. In addition, the client endorsed the following occur \"often\" or \"very often\": 6/9 (97th percentile) Inattention symptoms, 6/9 (98th?percentile) Hyperactivity/Impulsivity symptoms, and 5/9 (94th percentile) Sluggish Cognitive Tempo symptoms. Overall, the results suggest the client is reporting moderate symptoms of inattention and moderate symptoms of hyperactivity/impulsivity at this time.    ?   Current Symptoms Scale--Other Report:   Client's wife completed the collateral report inventory of current symptoms. Based on the collateral contact's observation of symptoms, the client demonstrates the following \"often\" or \"very often\": 0/9 Inattention symptoms, 1/5 Hyperactivity symptoms, 0/4 Impulsivity symptoms, and 2/9 Sluggish Cognitive Tempo " "symptoms. The client's Total ADHD Score was 25. The collateral- and self-report scores are discrepant. His wife did not note symptoms of ADHD at this time.     Client's sister completed the collateral report inventory of current symptoms. Based on the collateral contact's observation of symptoms, the client demonstrates the following \"often\" or \"very often\": 0/9 Inattention symptoms, 0/5 Hyperactivity symptoms, 2/4 Impulsivity symptoms, and 1/9 Sluggish Cognitive Tempo symptoms. The client's Total ADHD Score was 27. The collateral- and self-report scores are discrepant. His sister did not note symptoms of ADHD at this time.     Client's co-worker completed the collateral report inventory of current symptoms. Based on the collateral contact's observation of symptoms, the client demonstrates the following \"often\" or \"very often\": 3/9 Inattention symptoms, 0/5 Hyperactivity symptoms, 0/4 Impulsivity symptoms, and 4/9 Sluggish Cognitive Tempo symptoms. The client's Total ADHD Score was 29. The collateral- and self-report scores are discrepant. His co-worker noted fewer symptoms of ADHD at this time.     Childhood Symptoms Scale--Self-Report:   Client completed the self-report inventory of childhood symptoms. The results indicate that the client's Total ADHD Score was 36 which places them in the 82nd percentile for overall ADHD symptoms in childhood. In addition, the client endorsed having experienced the following \"often\" or \"very often\": 2/9 (83rd percentile) Inattention symptoms and 5/9 (93rd percentile) Hyperactivity-Impulsivity symptoms. Overall, the results suggest the client experienced mild symptoms of hyperactivity/impulsivity in childhood.      Childhood Symptoms Scale--Other Report:   Client's sister completed the collateral report inventory of childhood symptoms. Based on the collateral contact's recollection of client's childhood symptoms, the client demonstrated the following \"often\" or \"very often\": 0/9 " "Inattention symptoms and 1/9 Hyperactivity-Impulsivity symptoms. The client's Total ADHD Score was 27. The collateral-report and self-report scores are discrepant. His sister did not note symptoms of ADHD in childhood.   ?   Aaron Functional Impairment Scale: Self and Other Reports (BFIS)   The BFIS is used to assess an individuals' psychosocial impairment in major life/daily activities that may be due to a mental health disorder. This assessment measure includes self and collateral rating scales. Self-report scores are reported as percentiles. Scores at the 76th-83rd percentile are considered marginal, scores at the 84th-92nd percentile are considered borderline, scores at the 93rd-95th percentile are considered mild, scores at the 96th-98th percentile are considered moderate, and those at the 99th percentile are considered severe. Collateral or \"other\" rating scales are reported as number of symptoms observed in comparison to those reported by the client. Norms and percentile scores are not available for collateral reports.    ?   Results indicate the client identified impairment (scores at or greater than 93rd percentile) in the following areas: work, education, sexual relations, daily responsibilities, self-care routines, and health maintenance. The client's Mean Impairment Score was 5.21 (91st percentile) indicating the client is reporting borderline impairment in functioning across domains. Client's wife completed the collateral rating scale, which indicated discrepant results (e.g., Mean Impairment Score of 1.62). She did not note impairments in any areas. Client's sister completed the collateral rating scale, which indicated discrepant results (e.g., Mean Impairment Score of 2.14). She did not note impairments in any areas. Client's co-worker completed the collateral rating scale, which indicated discrepant results (e.g., Mean Impairment Score of 3.4). He noted impairment in the area of: work.    Aaron " "Deficits in Executive Functioning Scale (BDEFS)   The BDEFS is a measure used for evaluating dimensions of adult executive functioning in daily life. This assessment measure includes self and collateral rating scales. Self-report scores are reported as percentiles. Scores at the 76th-83rd percentile are considered marginal, scores at the 84th-92nd percentile are considered borderline, scores at the 93rd-95th percentile are considered mild, scores at the 96th-98th percentile are considered moderate, and those at the 99th percentile are considered severe. Collateral or \"other\" rating scales are reported as number of symptoms observed in comparison to those reported by the client. Norms and percentile scores are not available for collateral reports.    ?   Results indicate the client's Total Executive Functioning Score was 202 (93rd percentile). The ADHD-Executive Functioning Index score was 28 (96th percentile). These scores suggest the client is reporting mild to moderate deficits in executive functioning. They noted the following deficit: self-management to time (moderate); self-motivation (mild); and self-regulation of emotions (mild). Client's wife completed the collateral report which indicated discrepant results. She did not note deficits in any domains. Client's sister completed the collateral report which indicated discrepant results. She did not note deficits in any domains. Client's co-worker completed the collateral report which indicated discrepant results. He did not note deficits in any domains.     Generalized Anxiety Disorder Questionnaire (ADEEL-7)   This questionnaire is designed to screen for anxiety in adults. Based on the client's score of 9, they are reporting mild symptoms of anxiety at this time. Client identified the following symptoms of anxiety: not being able to stop or control worrying; worrying too much about different things; trouble relaxing; restlessness and becoming easily annoyed or " irritable.  ?   Patient Health Questionnaire- 9 (PHQ-9)    This questionnaire is designed to screen for depression in adults. Based on the client's score of 21, they are reporting severe symptoms of depression at this time. Client identified the following symptoms of depression: little interest or pleasure in doing things; feeling down/depressed/hopeless; trouble falling asleep/staying asleep/sleeping too much; feeling tired or having little energy; poor appetite or overeating; feeling bad about self; poor concentration; feeling fidgety/restless and thoughts of being better off dead or hurting self in some way.

## 2023-10-11 ENCOUNTER — MYC MEDICAL ADVICE (OUTPATIENT)
Dept: PSYCHOLOGY | Facility: CLINIC | Age: 38
End: 2023-10-11
Payer: COMMERCIAL

## 2023-10-16 ENCOUNTER — DOCUMENTATION ONLY (OUTPATIENT)
Dept: PSYCHOLOGY | Facility: CLINIC | Age: 38
End: 2023-10-16
Payer: COMMERCIAL

## 2023-10-17 ENCOUNTER — DOCUMENTATION ONLY (OUTPATIENT)
Dept: PSYCHOLOGY | Facility: CLINIC | Age: 38
End: 2023-10-17
Payer: COMMERCIAL
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
## 2023-10-20 ENCOUNTER — VIRTUAL VISIT (OUTPATIENT)
Dept: PSYCHOLOGY | Facility: CLINIC | Age: 38
End: 2023-10-20
Payer: COMMERCIAL

## 2023-10-20 DIAGNOSIS — F33.2 SEVERE RECURRENT MAJOR DEPRESSION WITHOUT PSYCHOTIC FEATURES (H): ICD-10-CM

## 2023-10-20 DIAGNOSIS — F41.1 GENERALIZED ANXIETY DISORDER: Primary | ICD-10-CM

## 2023-10-20 PROCEDURE — 96131 PSYCL TST EVAL PHYS/QHP EA: CPT | Mod: 95 | Performed by: PSYCHOLOGIST

## 2023-10-20 PROCEDURE — 96130 PSYCL TST EVAL PHYS/QHP 1ST: CPT | Mod: 95 | Performed by: PSYCHOLOGIST

## 2023-10-20 NOTE — PROGRESS NOTES
Client Name: Elijah Biswas  Date: 10/20/23     Service Type: Individual (ADHD Evaluation feedback session)   ?   Session Start Time: 1:00 Session End Time: 1:20     ?   Session Length: 20 minutes    ?   Session #: (feedback)   ?   Attendees: Client attended alone      Telemedicine Visit: The patient's condition can be safely assessed and treated via synchronous audio and visual telemedicine encounter.      Reason for Telemedicine Visit: Services only offered telehealth    Originating Site (Patient Location): Patient's place of employment      Distant Location (provider location):  Off-site    Consent:  The patient/guardian has verbally consented to: the potential risks and benefits of telemedicine (video visit) versus in person care; bill my insurance or make self-payment for services provided; and responsibility for payment of non-covered services.     Mode of Communication:  Video Conference via [employer]    As the provider I attest to compliance with applicable laws and regulations related to telemedicine.    ?   ?   DATA   ?   ?   Treatment Objective(s) Addressed in This Session:    Provided feedback on ADHD evaluation. Reviewed test results in depth and answered client's questions. Client diagnosed with Generalized Anxiety Disorder and Major Depressive Disorder, Recurrent, Severe. Reviewed ADHD Coping Skills Handout. This provider also completed full written report of evaluation, including integration of testing data, summary, and recommendations. Please see Documentation Only dated 10/17/23.   ?   Progress on / Status of Treatment Objective(s) / Homework:    Completed    ?   Intervention:   ADHD Evaluation feedback; Reviewed report (can be found in Documentation Only encounter dated 10/17/23); Client was appreciative of the feedback and expressed understanding of the diagnoses.   ?   ?   ASSESSMENT: Current Emotional / Mental Status (status of significant symptoms):   Risk status (Self / Other harm or suicidal  ideation)   Client denies current fears or concerns for personal safety.   Client denies current or recent suicidal ideation or behaviors.   Client denies current or recent homicidal ideation or behaviors.   Client denies current or recent self-injurious behavior or ideation.   Client denies other safety concerns.   A safety and risk management plan has not been developed at this time, however client was given the after-hours number / 911 should there be a change in any of these risk factors.   ?   Appearance: Appropriate   Eye Contact: Good   Psychomotor Behavior: Normal   Attitude: Cooperative    Orientation: All   Speech   Rate / Production: Normal    Volume: Normal    Mood: Normal   Affect: Appropriate    Thought Content: Clear    Thought Form: Coherent Logical    Insight: Good    ?   Medication Review:   Client is prescribed Zoloft and Wellbutrin     Medication Compliance:   Yes  ?   Changes in Health Issues:   None reported   ?   Chemical Use Review:   Substance Use: Chemical use reviewed, no active concerns identified     ?   Tobacco Use: No current tobacco use.    ?   Collateral Reports Completed:   Routed note to Care Team Member(s)   ?   PLAN: (Homework, other)   ?   Recommendations:      1. Schedule a medication evaluation with your physician. Medications are often beneficial in treating anxiety and depression symptoms.  ??   2. Individual therapy is recommended. Therapies focused on identifying and challenging problematic thought and behavior patterns while increasing the use of healthy coping skills has been found to be effective in treating anxiety and depression. It will be important to set goals in this therapy and work actively toward achieving short-term successes that lead to the completion of each goal. Action-oriented therapies, such as CBT and ACT (Acceptance and Commitment Therapy) are particularly recommended for the treatment of chronic anxiety and depression.??   ??   3. The use of  behavioral strategies such as diaphragmatic breathing, guided imagery, and mindfulness is often helpful in the management of anxiety symptoms.??   ?   4. The following compensatory strategies may be useful to cope with reported inattention symptoms:    a. Maintaining a predictable routine and structured environment that incorporates prioritized checklists and reminders (e.g., Post-Its).   b. When completing tasks, try to focus on one task at a time and complete it in its entirety before moving on to the next task.   c. Minimize background distractions when working on complex tasks. For example, TV, radio or ongoing conversations in the background may hinder ability to focus on the task at hand.   d. Take regular breaks from tasks that require prolonged attention. In general, regular breaks from complex tasks can help prevent lapses in attention, which can result in errors.   e. Outline the steps required to complete a task prior to beginning it, which can help ensure an organized approach. Use the outline to refer to throughout the task as a reminder of the steps to be completed.   ?   Luz Harkins, PhD, LP?   Licensed Psychologist?       Psychological Testing    Billing/Services Summary    ?    Testing Evaluation Services  Base: 79926   (1st 60 mins)  Add-on: 45907   (each addtl 60 mins)    Record Review and Clarify Referral Question    (12:40/1:00), (8/29/23)  20 minutes    Integration/Report Generation  (3:00/4:00), (9/7/23) - MMPI-2  (12:00/1:00), (10/10/23) -Aaron Scales  (12:00/1:00), (10/16/23) - CNS Vital Signs  (3:00/4:00), (10/17/23) - Report  60 minutes   60 minutes  60 minutes  60 minutes   Interactive Feedback Session   (1:00/1:20), (10/20/23)  20 minutes    Total Time:  280 minutes (4 hours, 40 minutes)    Total Units:  1  4   ?    ?    Diagnosis(es): (ICD-10)   Generalized Anxiety Disorder (F41.1)  Major Depressive Disorder, Recurrent, Severe (F33.2)

## 2023-12-19 NOTE — PROGRESS NOTES
CNS Vital Signs Neurocognitive Battery   The CNS Vital Signs Neurocognitive Battery is a remotely-administered assessment comprised of seven core subtests to individually measure the patient's verbal memory, visual memory, motor speed, psychomotor speed, reaction time, focus, ability to sustain attention and ability to adapt to changing rules and tasks.        Above average domain scores indicate a standard score (SS) greater than 109 or a Percentile Rank (CO) greater than 74, indicating a high functioning test subject. Average is a SS  or CO 25-74, indicating normal function. Low Average is a SS 80-89 or CO 9-24 indicating a slight deficit or impairment. Below Average is a SS 70-79 or CO 2-8, indicating a moderate level of deficit or impairment. Very Low is a SS less than 70 or a CO less than 2, indicating a deficit and impairment.  Validity Indicator denotes a guideline for representing the possibility of an invalid test or domain score, and can be influenced by patient understanding, effort, or other conditions.     The patient's results are detailed below:      Domain  Standard Score  Percentile  Description  Validity    Neurocognitive Index   100  50  Average Y    Composite?Memory?Measure  121 92 Above Average Y   Verbal Memory  106 66 Average Y   Visual?Memory  129 97 Above Average Y   Psychomotor Speed  119 90 Above Average Y   Reaction Time  94 34 Average Y   Complex Attention  87 19 Low Average Y   Cognitive Flexibility  79 8 Low Y   Processing Speed  101 53 Average Y   Executive Function  79 8 Low Y   Simple Attention  107 68 Average Y   Motor Speed  123 94 Above Average Y      Neurocognitive?Index?(NCI): Measures an average score derived from the domain scores or a general assessment of the overall neurocognitive status of the patient. The patient's NCI score is 100, with a percentile of 50, and falls within the Average range.      Composite?Memory: Measures how well subject can recognize, remember,  Internal Medicine and retrieve words and geometric figures, and is comprised of the Visual and Verbal Memory domains. The patient's Composite Memory score is 121, with a percentile of 92, and falls within the Above Average range.      Verbal?Memory: Measures how well subject can recognize, remember, and retrieve words. The patient's Verbal Memory score is 106, with a percentile of 66, and falls within the Average range.      Visual?Memory: Measures how well subject can recognize, remember and retrieve geometric figures. The patient's Visual Memory score is 129, with a percentile of 97, and falls within the Above Average range.      Psychomotor?Speed: Measures how well a subject perceives, attends, responds to complex visual-perceptual information and performs simple fine motor coordination, and is comprised of the Motor Speed and Processing Speed indexes. The patient's Psychomotor Speed score is 119, with a percentile of 90, and falls within the Above Average range.      Reaction?Time: Measures how quickly the subject can react, in milliseconds, to a simple and increasingly complex direction set. The patient's Reaction Time score is 94, with a percentile of 34, and falls within the Average range.      Complex?Attention: Measures the ability to track and respond to a variety of stimuli over lengthy periods of time and/or perform complex mental tasks requiring vigilance quickly and accurately. The patient's Complex Attention score is 87, with a percentile of 19, and falls within the Low Average range.      Cognitive?Flexibility: Measures how well subject is able to adapt to rapidly changing and increasingly complex set of directions and/or to manipulate the information. The patient's Cognitive Flexibility score is 79, with a percentile of 8, and falls within the Low range.      Processing?Speed: Measures how well a subject recognizes and processes information i.e., perceiving, attending/responding to incoming information, motor speed,  fine motor coordination, and visual-perceptual ability. The patient's Processing Speed score is 101, with a percentile of 53, and falls within the Average range.      Executive?Function: Measures how well a subject recognizes rules, categories, and manages or navigates rapid decision making. The patient's Executive Function score is 79, with a percentile of 8, and falls within the Low range.      Simple?Attention: Measures the ability to track and respond to a single defined stimulus over lengthy periods of time while performing vigilance and response inhibition quickly and accurately to a simple task. The patient's Simple Attention score is 107, with a percentile of 68, and falls within the Average range.      Motor?Speed: Measure: Ability to perform simple movements to produce and satisfy an intention towards a manual action and goal. The patient's Motor Speed score is 123, with a percentile of 94, and falls within the Above Average range.       Client sent the following message about his experience with the testing (it is believed this accounts for lower scores in those domains)  Elijah Biswas, Luz Guzman, PhD  Phone Number: [phone]     Just completed the testing, I hope we get to discuss these assessments. Just as a heads up, sticky keys activated during the shifting attention (shapes and colors) section, so things went a bit astray. Thank you!

## 2024-02-04 ENCOUNTER — NURSE TRIAGE (OUTPATIENT)
Dept: NURSING | Facility: CLINIC | Age: 39
End: 2024-02-04
Payer: COMMERCIAL

## 2024-02-05 NOTE — TELEPHONE ENCOUNTER
"Red Khushboo Nava reports Pain & FB sensation to his Rt Eye    ~4:30 pm, (~3.5h)   - Tree branch hit his face & brushed his open Rt Eye  - \"Hurts a lot\"  - Irrigated several times without relief.    No loss of vision - He is able to see from the eye.    ER advised    NAIDA Quintero [facility]      Reason for Disposition   Foreign body in the eye   [1] Eye has been washed out > 30 minutes ago AND [2] feels like FB is still present   [1] Eye pain AND [2] persists > 1 hour since irrigation (regardless of duration of flushing)    Additional Information   Negative: [1] Major bleeding (e.g., actively dripping or spurting) AND [2] can't be stopped   Negative: Knocked out (unconscious) > 1 minute   Negative: Difficult to awaken or acting confused (e.g., disoriented, slurred speech)   Negative: Severe neck pain   Negative: Sounds like a life-threatening emergency to the triager   Negative: [1] Sharp FB (even if FB was removed) AND [2] any pain present now   Negative: Foreign body (FB) stuck on eyeball  (Exception: Contact lens.)   Negative: FB hit eye at high speed (e.g., small metallic chip from hammering, lawnmower, BB gun, explosion)    Protocols used: Eye Injury-A-AH, Eye - Foreign Body-A-AH    "

## 2024-08-06 ENCOUNTER — VIRTUAL VISIT (OUTPATIENT)
Dept: UROLOGY | Facility: CLINIC | Age: 39
End: 2024-08-06
Payer: COMMERCIAL

## 2024-08-06 DIAGNOSIS — Z30.09 VASECTOMY EVALUATION: Primary | ICD-10-CM

## 2024-08-06 PROCEDURE — 99203 OFFICE O/P NEW LOW 30 MIN: CPT | Mod: 95 | Performed by: UROLOGY

## 2024-08-06 ASSESSMENT — PAIN SCALES - GENERAL: PAINLEVEL: NO PAIN (0)

## 2024-08-06 NOTE — PROGRESS NOTES
VASECTOMY CONSULTATION NOTE  DATE OF VISIT: 8/6/2024  [facility]   PATIENT NAME: Tima Biswas    YOB: 1985      REASON FOR CONSULTATION: Mr. Tima Biswas is a 38 year old year old gentleman who is seen today requesting a vasectomy. He has 3 children - 4 year old, 2 year old, 3 month old - and he wishes to have a vasectomy for birth control. His wife is in agreement with this plan.     PAST MEDICAL HISTORY:   Past Medical History:   Diagnosis Date    Depressive disorder     Gastroesophageal reflux disease     OCD (obsessive compulsive disorder)        PAST SURGICAL HISTORY:   Past Surgical History:   Procedure Laterality Date    ESOPHAGOSCOPY, GASTROSCOPY, DUODENOSCOPY (EGD), COMBINED N/A 5/6/2019    Procedure: ESOPHAGOGASTRODUODENOSCOPY, WITH BIOPSY;  Surgeon: Stella Viera MD;  Location: [facility] OR    GENITOURINARY SURGERY         MEDICATIONS:   Current Outpatient Medications:     buPROPion (WELLBUTRIN XL) 150 MG 24 hr tablet, Take 1 tablet (150 mg) by mouth every morning +++APPOINTMENT DUE++++, Disp: 15 tablet, Rfl: 0    gabapentin (NEURONTIN) 300 MG capsule, Take 1 capsule (300 mg) by mouth 3 times daily as needed (anxiety), Disp: 180 capsule, Rfl: 0    nystatin (MYCOSTATIN) 267305 UNIT/ML suspension, Swish and swallow 5 mL inside of the mouth four times a day for 7 to 14 days. Hold in mouth for as long as possible (several minutes) before swallowing., Disp: 280 mL, Rfl: 0    omeprazole (PRILOSEC) 20 MG DR capsule, Take 1 capsule (20 mg) by mouth daily, Disp: 90 capsule, Rfl: 1    sertraline (ZOLOFT) 100 MG tablet, Take 1 tablet (100 mg) by mouth daily, Disp: 90 tablet, Rfl: 1    ALLERGIES:   Allergies   Allergen Reactions    Cats        FAMILY HISTORY:   Family History   Problem Relation Age of Onset    Mental Illness Mother     LUNG DISEASE Mother         idiopathic lung disease treated with steroids    Depression Father     Neuromuscular Disease Father         Guillan Trevorton associated  with chronic Lyme    Substance Abuse Maternal Grandmother     Sleep walking Brother     Asperger's syndrome Brother     Depression Brother     Sleep walking Brother     Asperger's syndrome Nephew     Substance Abuse Paternal Uncle     Personality Disorder Paternal Uncle     Substance Abuse Maternal Uncle     Lung Cancer No family hx of        SOCIAL HISTORY:   Social History     Socioeconomic History    Marital status:      Spouse name: Not on file    Number of children: Not on file    Years of education: Not on file    Highest education level: Not on file   Occupational History    Not on file   Tobacco Use    Smoking status: Never    Smokeless tobacco: Never   Substance and Sexual Activity    Alcohol use: Yes     Alcohol/week: 3.0 - 6.0 standard drinks of alcohol     Types: 1 - 2 Glasses of wine, 1 - 2 Cans of beer, 1 - 2 Shots of liquor per week     Comment: rare    Drug use: No    Sexual activity: Not on file   Other Topics Concern    Not on file   Social History Narrative     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     Social Determinants of Health     Financial Resource Strain: Low Risk  (5/8/2023)    [facility]    Financial Resource Strain     Difficulty of Paying Living Expenses: 3     Difficulty of Paying Living Expenses: Not on file   Food Insecurity: No Food Insecurity (5/8/2023)    [facility], [employer] [facility]    Food Insecurity     Worried About Running Out of Food in the Last Year: 1   Transportation Needs: No Transportation Needs (5/8/2023)    [facility] [employer] [facility]    Transportation Needs     Lack of Transportation (Medical): 1   Physical Activity: Not on file   Stress: Not on file   Social Connections: Unknown (5/15/2024)    [facility] [employer]  [facility]    Social Connections     Frequency of Communication with Friends and Family: Not on file   Interpersonal Safety: Not on file   Housing Stability: Low Risk  (5/8/2023)    Received from [facility], [facility]    Housing Stability     Unable to Pay for Housing in the Last Year: 1       PHYSICAL EXAM  Patient is a 38 year old  male   Vitals: There were no vitals taken for this visit.  There is no height or weight on file to calculate BMI.  General Appearance Adult:   Alert, no acute distress, oriented  Neuro: Alert, oriented, speech and mentation normal  Psych: affect and mood normal     IMAGING:  TESTICULAR U/S 2020:  Findings:  The testes demonstrate normal and symmetric echotexture and  vascularity. No evidence of a focal mass. There are two or three  punctate calcifications in the right testicular parenchyma. The right  testicle measures 3.0 x 2.2 x 4.7 cm and the left measures 3.1 x 2.1 x  3.9 cm. There is no evidence of torsion.     No significant hydrocele. Prominent veins in the left pampiniform  plexus measuring up to 3 mm in diameter without Valsalva and  approximately 4 mm during Valsalva. Tiny anechoic cyst in the right  epididymal head measuring 5 mm. Both the left and right epididymis  otherwise appear normal.                                                                         Impression:   1.  Simple right epididymal head cyst measuring 5 mm.  2.  Mild left varicocele.    DIAGNOSIS: Request for sterilization    PLAN: The risks of the procedure as well as expectations for recovery and outcomes were explained in detail to him.  He was counseled on the risks for bleeding infection and pain after the procedure. We discussed the risk of post-vasectomy pain syndrome.  He was instructed to continue to use contraception until he had proven azoospermia on a semen specimen.  This would normally be collected at  least 3 months after the procedure. Also discussed the rare, but possible risk of re-canalization of the vas, even after successful vasectomy with sterile semen specimen.  He was instructed to hold all anticoagulants medications for one week prior to the procedure.  It was recommended that he have someone else drive him home after his vasectomy.  In light of these risks and expectations he would like to proceed.  We are scheduling a vasectomy in the office in the near future.    Pt. Understands:  -1/1000-1/3000 risk of future pregnancy even with perfectly done vasectomy  -vasectomy is a permanent procedure    -he may cryopreserve sperm if he wishes   -1-5% risk of post-vasectomy pain syndrome   -1-5% risk of complication, primarily infection or bleeding  - he needs to have a semen sample that shows no sperm before getting approval for unprotected intercourse.      Thank you for the kind consultation.    Time spent: 15 minutes spent on the date of the encounter doing chart review, history and exam, documentation and further activities as noted above.     Owen Ridley MD   Urology  [facility]  Clinic Phone [phone]        Virtual Visit Details    Type of service:  Video Visit     Originating Location (pt. Location): Home    Distant Location (provider location):  On-site  Platform used for Video Visit: Eliezer

## 2024-08-06 NOTE — NURSING NOTE
Current patient location: [address] [place], [place] [address] [facility] [id number]    Is the patient currently in the state of [place]? YES    Visit mode:VIDEO    If the visit is dropped, the patient can be reconnected by: VIDEO VISIT: Text to cell phone:   Telephone Information:   Mobile [phone]       Will anyone else be joining the visit? NO  (If patient encounters technical issues they should call [phone])    How would you like to obtain your AVS? MyChart    Are changes needed to the allergy or medication list? No    Are refills needed on medications prescribed by this physician? NO    Rooming Documentation:  Not applicable      Reason for visit: Consult (VASECTOMY CONSULT)    Elena Gaytan VVF    Depression Response    Patient declined to continue to the phq9, stating that he is already being seen for mental health needs

## 2024-08-07 ENCOUNTER — TELEPHONE (OUTPATIENT)
Dept: UROLOGY | Facility: CLINIC | Age: 39
End: 2024-08-07
Payer: COMMERCIAL

## 2024-08-07 NOTE — TELEPHONE ENCOUNTER
Left Voicemail (1st Attempt) for the patient to call back and schedule the following:    Appointment type: Vasectomy  Provider: Dr. Ridley  Return date: Next available  Specialty phone number: [phone]  Additonal Notes: Consult 8/6/24. Can schedule in [place] or [place].     Rupali stoddard Complex   Dermatology, Surgery, Urology  [facility]        English

## 2024-08-22 ENCOUNTER — OFFICE VISIT (OUTPATIENT)
Dept: UROLOGY | Facility: CLINIC | Age: 39
End: 2024-08-22
Payer: COMMERCIAL

## 2024-08-22 VITALS — SYSTOLIC BLOOD PRESSURE: 147 MMHG | DIASTOLIC BLOOD PRESSURE: 81 MMHG | HEART RATE: 71 BPM

## 2024-08-22 DIAGNOSIS — Z30.2 ENCOUNTER FOR STERILIZATION: Primary | ICD-10-CM

## 2024-08-22 PROCEDURE — 55250 REMOVAL OF SPERM DUCT(S): CPT | Performed by: UROLOGY

## 2024-08-22 NOTE — NURSING NOTE
Tima Biswas's goals for this visit include:   Chief Complaint   Patient presents with    Vasectomy     Voluntary sterilization        He requests these members of his care team be copied on today's visit information:       PCP: [facility]    Referring Provider:  Referred Self, MD  No address on file    BP (!) 147/81 (BP Location: Right arm, Patient Position: Sitting, Cuff Size: Adult Regular)   Pulse 71     Do you need any medication refills at today's visit?     Anai Perla LPN on 8/22/2024 at 3:21 PM

## 2024-08-22 NOTE — PROGRESS NOTES
OFFICE VASECTOMY OPERATIVE NOTE  [facility]     DATE: 08/22/24  PATIENT: Tima Biswas    YOB: 1985    Tima Biswas is a 38 year old male.  He has 3 children and he wishes a vasectomy for birth control.  He has read the brochure and he has shaved himself.  I reviewed the vasectomy procedure with him explaining that it would be done with a local anesthetic given just in the location where the vasectomy would be done.  It would be done through incisions with the removal of segments of the vasa, cauterization of the ends, and burying the ends separate with sutures.      Pt. Understands:  -1/1000-1/3000 risk of future pregnancy even with perfectly done vasectomy  -vasectomy is a permanent procedure    -he may cryopreserve sperm if he wishes   -1-5% risk of post-vasectomy pain syndrome   -1-5% risk of complication, primarily infection or bleeding  - he needs to have a semen sample that shows no sperm before getting approval for unprotected intercourse.      Complications such as bleeding, infection, and damage to other tissues in the area were discussed. I recommended that an ice bag be placed on the scrotum off and on tonight to help reduce pain and swelling.      He was reminded that he was not sterile immediately after the vasectomy that it would take at least 20 ejaculations to empty the vas of any remaining sperm.  He was not to provide a semen sample until after the 20th ejaculation and not before 12 weeks after the vas. He was  to fulfill both of those requirements.   He understands it is his responsibility to find out the results of the vas before proceeding with intercourse without birth control protection.  Other items discussed were activity afterwards, returning to work, voluntary physical activity,  resuming sexual activity, clothing to wear, bathing, and care of the vas site and expected changes in the site as healing progresses.  After signing the permit, bilateral vasectomy was  done as described below.     ANESTHESIA: Local    DETAILS OF PROCEDURE: The risks of the procedure were explained in detail to the patient and informed consent was obtained. The patient was placed supine on the procedure table and the penis and scrotum were prepped and draped in the standard sterile fashion. The right vas deferens was isolated and brought up to the skin. 1% lidocaine local anesthesia was used to infiltrate the skin and the spermatic cord. A small incision was created and adventitial tissues were swept away from the vas. A 1 cm segment of the vas was excised and sent for pathology. The proximal and distal lumina of the vas were cauterized and then each segment was tied off in a knuckling-fashion with a 3-0 vicryl suture. Hemostasis was ensured and the segments were released back into the scrotum. Meticulous hemostasis was achieved. The skin was closed with 3-0 chromic suture in a horizontal mattress fashion. Next the left vas was brought to the skin and a vasectomy was performed in the similar fashion. The skin was closed with 3-0 chromic suture in a horizontal mattress fashion.    COMPLICATIONS: None    TAKE HOME MEDICATIONS: Tylenol every 6 hours, PRN    DISMISSAL INSTRUCTIONS:  - Ice pack to scrotum 15 to 20 minutes each hour awake for 36 to 40 hours.  - No strenuous activity or ejaculation for at least 7 days.  - No unprotected sexual activity until proven azoospermia on semen samples at 3 months.  - Referred to patient handout for normal postop expectations and indications to contact nurse or physician.    M.D.: Owen Ridley MD

## 2024-08-22 NOTE — PATIENT INSTRUCTIONS
Vasectomy Post-Op Care Instructions     You may go home after the procedure is completed. There may be some pain in your groin for 3 or 4 days after the operation. Some blood or yellow liquid may ooze from the cuts on the outside. The area around the cuts may swell and bruise. The sutures will dissolve on their own and do not require removal by the physician.    The first 48 hours after the procedure are crucial to healing. Generally, you may feel very good the day after the procedure, but that does not mean it is time to go back to normal activities. Resuming normal activities too soon is likely to cause internal bleeding and lots of pain.      Your provider may advise the following ways to care for yourself after the procedure:    Put an ice bag or package of frozen peas covered by a thin towel over the scrotum after the procedure. Leave the ice on the area for 30 minutes and then take it off for 30 minutes. Do this off and on for at least 24 hours.      Avoid all heavy lifting (greater than 10 pounds) for at least one week.    Wear a jockstrap or tight-fitting underwear for approximately one week to support the scrotum (testicles) and help reduce discomfort.    Take a pain reliever, such as Acetaminophen (Tylenol) or Ibuprofen (Advil), for any pain after the procedure. Your provider may prescribe a stronger pain medicine if it is needed.    You should be able to return to work in 48 hours, but strenuous activity should be avoided for two weeks.    Do not submerge the incision for 1 week following the procedure.    Ejaculation should be avoided for one week to allow the area to heal.    Follow-Up    You will need to have a negative post vasectomy analyses (no sperm seen) before you can discontinue birth control.    Semen Analysis   Schedule the post-vasectomy semen analysis three months after your vasectomy. You will need to ejaculate at least 20 times between your surgery and the first sample. Clinic staff will  contact your regarding your results via phone.    You will be given a container after the procedure. Collect the specimen at home by masturbation only (no lubrication, powders, saliva, or intercourse can be used) and bring it to the laboratory. You must have an appointment to drop off your specimen. The specimen needs to be delivered to the lab within 30-45 minutes.    Call [phone] with questions. For concerns or questions after hours or on weekends, please page the Urology Resident on-call: [phone].

## 2024-09-18 ENCOUNTER — TRANSCRIBE ORDERS (OUTPATIENT)
Dept: OTHER | Age: 39
End: 2024-09-18

## 2024-09-18 DIAGNOSIS — R20.0 NUMBNESS AND TINGLING IN RIGHT HAND: Primary | ICD-10-CM

## 2024-09-18 DIAGNOSIS — R20.2 NUMBNESS AND TINGLING IN RIGHT HAND: Primary | ICD-10-CM

## 2024-09-19 ENCOUNTER — PATIENT OUTREACH (OUTPATIENT)
Dept: CARE COORDINATION | Facility: CLINIC | Age: 39
End: 2024-09-19
Payer: COMMERCIAL

## 2024-09-23 ENCOUNTER — PATIENT OUTREACH (OUTPATIENT)
Dept: CARE COORDINATION | Facility: CLINIC | Age: 39
End: 2024-09-23
Payer: COMMERCIAL

## 2024-10-01 NOTE — PROGRESS NOTES
ASSESSMENT & PLAN    Elijah was seen today for numbness.    Diagnoses and all orders for this visit:    Numbness and tingling in right hand  -     Orthopedic  Referral  -     XR Wrist Right G/E 3 Views; Future      This issue is chronic and Worsening.      # Right wrist pain: Tima Biswas  was seen today for right wrist pain and paraesthesias. Symptoms had been going on for 6 years and worsening, becoming more consistent. On examination there are positive findings of reproduction of symptoms at the carpal tunnel. Imaging findings showed unremarkable wrist xrays. Likely cause of patient's condition due to carpal tunnel, though given symptoms throughout the hand, there may be an additional peripheral neuropathy. Other possible conditions contributing to symptoms include cervical radiculopathy, but no reproducible symptoms on c-spine exam today.  Counseled patient on nature of condition and treatment options.      - EMG R upper extremity  - Treatment: Activities as tolerated, home exercises given today. Patient declined hand therapy for now, but is open to it.   - Wrist splint recommended to wear, mio at night. Patient declined ours, would like to get one on his own.  - Medications/Injections: Limited tylenol/ibuprofen/ Topical Voltaren gel for 2-4 weeks as needed  - hand surgery referral as patient is interested in hearing surgical options    Follow-up: After EMG if symptoms do not improve, sooner if worsening  Can consider steroid injection if EMG confirms carpal tunnel, but would likely delay surgery by up to 3 months      Roland Albrecht MD  [facility]    -----  Chief Complaint   Patient presents with    Right Hand - Numbness       SUBJECTIVE  Tima Biswas is a/an 38 year old male who is seen in consultation at the request of  Nikita Sneed M.D. for evaluation of right hand.     The patient is seen by themselves.  The patient is Right handed    Onset:  6 years(s) ago. Reports insidious onset without acute precipitating event.  He reports first noticing numbness and tingling after chopping down trees in 2018.  He reports more frequent numbness and tingling, as well as loss of  strength.  Location of Pain: right volar forearm, hand and fingers  Worsened by: gripping, feels symptoms every morning after sleeping, use of cell phone  Better with: unsure  Treatments tried: home exercises  Associated symptoms: weakness of right hand    Orthopedic/Surgical history: NO  Social History/Occupation:  at [employer]      REVIEW OF SYSTEMS:  Review of Systems    OBJECTIVE:  /82   Pulse 76   Ht 1.829 m (6')   Wt 88.2 kg (194 lb 6.4 oz)   BMI 26.37 kg/m     General: healthy, alert and in no distress  Skin: no suspicious lesions or rash.  CV: distal perfusion intact   Resp: normal respiratory effort without conversational dyspnea   Psych: normal mood and affect  Gait: NORMAL  Neuro: Normal light sensory exam of bilateral upper extremities    Bilateral WRIST  Inspection:    No swelling or obvious deformity or asymmetry  Palpation:No tenderness. Remainder of bony and ligamentous line marks are nontender.     Metacarpals: normal    Thumb: normal    Fingers: normal  Range of Motion:    Full (active and passive) flexion, extension, pronation/supination, and ulnar/radial deviation.  Strength:    No deficits in flexion, extension, ulnar/radial deviation, or  strength.. Normal pinch strength.  Special Tests:    Positive: Phalen's, Tinel's (carpal tunnel), Tinel's (cubital tunnel to a lesser degree)    Negative: thenar eminence wasting, hypothenar eminence wasting.        RADIOLOGY:  Final results and radiologist's interpretation, available in the [facility] health record.  Images were reviewed with the patient in the office today.  My personal interpretation of the performed imaging:   Unremarkable wrist xrays

## 2024-10-02 ENCOUNTER — OFFICE VISIT (OUTPATIENT)
Dept: ORTHOPEDICS | Facility: CLINIC | Age: 39
End: 2024-10-02
Attending: FAMILY MEDICINE
Payer: COMMERCIAL

## 2024-10-02 ENCOUNTER — ANCILLARY PROCEDURE (OUTPATIENT)
Dept: GENERAL RADIOLOGY | Facility: CLINIC | Age: 39
End: 2024-10-02
Attending: STUDENT IN AN ORGANIZED HEALTH CARE EDUCATION/TRAINING PROGRAM
Payer: COMMERCIAL

## 2024-10-02 VITALS
HEIGHT: 72 IN | SYSTOLIC BLOOD PRESSURE: 132 MMHG | BODY MASS INDEX: 26.33 KG/M2 | HEART RATE: 76 BPM | WEIGHT: 194.4 LBS | DIASTOLIC BLOOD PRESSURE: 82 MMHG

## 2024-10-02 DIAGNOSIS — R20.2 NUMBNESS AND TINGLING IN RIGHT HAND: ICD-10-CM

## 2024-10-02 DIAGNOSIS — R20.0 NUMBNESS AND TINGLING IN RIGHT HAND: ICD-10-CM

## 2024-10-02 PROCEDURE — 99203 OFFICE O/P NEW LOW 30 MIN: CPT | Mod: 24 | Performed by: STUDENT IN AN ORGANIZED HEALTH CARE EDUCATION/TRAINING PROGRAM

## 2024-10-02 PROCEDURE — 73110 X-RAY EXAM OF WRIST: CPT | Mod: TC | Performed by: RADIOLOGY

## 2024-10-02 NOTE — PATIENT INSTRUCTIONS
EMG Options Outside of [facility]  When making appointment, please make sure that they will accept your insurance.    [facility] [place]  Address: [address] [place] [address]  Phone: [phone]  Fax: [phone]    [facility]  Address: [address]  [place] [address]  Phone: [phone]  Fax: [phone]    [facility]  Dr. Bhupinder Johnson MD  Phone: [phone]  Fax: [phone]    [facility] [place]  [address], [address]  [place] [address]  Phone; [phone]  Fax: [phone]

## 2024-10-02 NOTE — LETTER
10/2/2024      Tima Biswas  [address] [place] [address]      Dear Colleague,    Thank you for referring your patient, Tima Biswas, to the [facility]. Please see a copy of my visit note below.    ASSESSMENT & PLAN    Elijah was seen today for numbness.    Diagnoses and all orders for this visit:    Numbness and tingling in right hand  -     Orthopedic  Referral  -     XR Wrist Right G/E 3 Views; Future      This issue is chronic and Worsening.      # Right wrist pain: Tima Biswas  was seen today for right wrist pain and paraesthesias. Symptoms had been going on for 6 years and worsening, becoming more consistent. On examination there are positive findings of reproduction of symptoms at the carpal tunnel. Imaging findings showed unremarkable wrist xrays. Likely cause of patient's condition due to carpal tunnel, though given symptoms throughout the hand, there may be an additional peripheral neuropathy. Other possible conditions contributing to symptoms include cervical radiculopathy, but no reproducible symptoms on c-spine exam today.  Counseled patient on nature of condition and treatment options.      - EMG R upper extremity  - Treatment: Activities as tolerated, home exercises given today. Patient declined hand therapy for now, but is open to it.   - Wrist splint recommended to wear, mio at night. Patient declined ours, would like to get one on his own.  - Medications/Injections: Limited tylenol/ibuprofen/ Topical Voltaren gel for 2-4 weeks as needed  - hand surgery referral as patient is interested in hearing surgical options    Follow-up: After EMG if symptoms do not improve, sooner if worsening  Can consider steroid injection if EMG confirms carpal tunnel, but would likely delay surgery by up to 3 months      Roland Albrecht MD  [facility]    -----  Chief Complaint   Patient presents with     Right Hand -  Numbness       SUBJECTIVE  Tima Biswas is a/an 38 year old male who is seen in consultation at the request of  Nikita Sneed M.D. for evaluation of right hand.     The patient is seen by themselves.  The patient is Right handed    Onset: 6 years(s) ago. Reports insidious onset without acute precipitating event.  He reports first noticing numbness and tingling after chopping down trees in 2018.  He reports more frequent numbness and tingling, as well as loss of  strength.  Location of Pain: right volar forearm, hand and fingers  Worsened by: gripping, feels symptoms every morning after sleeping, use of cell phone  Better with: unsure  Treatments tried: home exercises  Associated symptoms: weakness of right hand    Orthopedic/Surgical history: NO  Social History/Occupation:  at [employer]      REVIEW OF SYSTEMS:  Review of Systems    OBJECTIVE:  /82   Pulse 76   Ht 1.829 m (6')   Wt 88.2 kg (194 lb 6.4 oz)   BMI 26.37 kg/m     General: healthy, alert and in no distress  Skin: no suspicious lesions or rash.  CV: distal perfusion intact   Resp: normal respiratory effort without conversational dyspnea   Psych: normal mood and affect  Gait: NORMAL  Neuro: Normal light sensory exam of bilateral upper extremities    Bilateral WRIST  Inspection:    No swelling or obvious deformity or asymmetry  Palpation:No tenderness. Remainder of bony and ligamentous line marks are nontender.     Metacarpals: normal    Thumb: normal    Fingers: normal  Range of Motion:    Full (active and passive) flexion, extension, pronation/supination, and ulnar/radial deviation.  Strength:    No deficits in flexion, extension, ulnar/radial deviation, or  strength.. Normal pinch strength.  Special Tests:    Positive: Phalen's, Tinel's (carpal tunnel), Tinel's (cubital tunnel to a lesser degree)    Negative: thenar eminence wasting, hypothenar eminence wasting.        RADIOLOGY:  Final results and  radiologist's interpretation, available in the [facility] health record.  Images were reviewed with the patient in the office today.  My personal interpretation of the performed imaging:   Unremarkable wrist xrays               Again, thank you for allowing me to participate in the care of your patient.        Sincerely,        Roland Albrecht MD

## 2024-10-03 ENCOUNTER — PATIENT OUTREACH (OUTPATIENT)
Dept: CARE COORDINATION | Facility: CLINIC | Age: 39
End: 2024-10-03
Payer: COMMERCIAL

## 2024-10-07 ENCOUNTER — PATIENT OUTREACH (OUTPATIENT)
Dept: CARE COORDINATION | Facility: CLINIC | Age: 39
End: 2024-10-07
Payer: COMMERCIAL

## 2024-11-24 ENCOUNTER — HEALTH MAINTENANCE LETTER (OUTPATIENT)
Age: 39
End: 2024-11-24

## 2024-12-19 ENCOUNTER — LAB (OUTPATIENT)
Dept: LAB | Facility: CLINIC | Age: 39
End: 2024-12-19
Payer: COMMERCIAL

## 2024-12-19 DIAGNOSIS — Z30.2 ENCOUNTER FOR STERILIZATION: ICD-10-CM

## 2024-12-19 LAB — SEMEN ANALYSIS P VAS PNL: NORMAL

## (undated) DEVICE — ENDO BITE BLOCK ADULT OMNI-BLOC

## (undated) DEVICE — SPECIMEN CONTAINER 3OZ W/FORMALIN 59901

## (undated) DEVICE — SOL WATER IRRIG 1000ML BOTTLE 2F7114

## (undated) DEVICE — SYR 30ML SLIP TIP W/O NDL 302833

## (undated) DEVICE — SPECIMEN BAG MEDIVAC SUCTION WHITE SOCK 65652-122

## (undated) DEVICE — SUCTION MANIFOLD NEPTUNE 2 SYS 4 PORT 0702-020-000

## (undated) RX ORDER — DIPHENHYDRAMINE HYDROCHLORIDE 50 MG/ML
INJECTION INTRAMUSCULAR; INTRAVENOUS
Status: DISPENSED
Start: 2019-05-06

## (undated) RX ORDER — FENTANYL CITRATE 50 UG/ML
INJECTION, SOLUTION INTRAMUSCULAR; INTRAVENOUS
Status: DISPENSED
Start: 2019-05-06